# Patient Record
Sex: FEMALE | Race: OTHER | Employment: FULL TIME | URBAN - METROPOLITAN AREA
[De-identification: names, ages, dates, MRNs, and addresses within clinical notes are randomized per-mention and may not be internally consistent; named-entity substitution may affect disease eponyms.]

---

## 2017-03-25 ENCOUNTER — OFFICE VISIT (OUTPATIENT)
Dept: INTERNAL MEDICINE CLINIC | Facility: CLINIC | Age: 57
End: 2017-03-25

## 2017-03-25 VITALS
HEIGHT: 59 IN | WEIGHT: 120.25 LBS | HEART RATE: 67 BPM | RESPIRATION RATE: 16 BRPM | SYSTOLIC BLOOD PRESSURE: 116 MMHG | OXYGEN SATURATION: 99 % | TEMPERATURE: 98 F | BODY MASS INDEX: 24.24 KG/M2 | DIASTOLIC BLOOD PRESSURE: 74 MMHG

## 2017-03-25 DIAGNOSIS — H54.62 VISION LOSS OF LEFT EYE: ICD-10-CM

## 2017-03-25 DIAGNOSIS — G43.009 MIGRAINE WITHOUT AURA AND WITHOUT STATUS MIGRAINOSUS, NOT INTRACTABLE: ICD-10-CM

## 2017-03-25 DIAGNOSIS — G89.29 CHRONIC MIDLINE THORACIC BACK PAIN: Primary | ICD-10-CM

## 2017-03-25 DIAGNOSIS — M54.6 CHRONIC MIDLINE THORACIC BACK PAIN: Primary | ICD-10-CM

## 2017-03-25 PROCEDURE — 99204 OFFICE O/P NEW MOD 45 MIN: CPT | Performed by: INTERNAL MEDICINE

## 2017-03-25 RX ORDER — MV,CA,MIN/IRON/FA/GUARANA/CAFF 9-400-200
1 TABLET ORAL DAILY
COMMUNITY
End: 2017-08-28 | Stop reason: ALTCHOICE

## 2017-03-25 NOTE — PATIENT INSTRUCTIONS
Consejos Para [place] [facility] Tips]    Hay ciertas cosas que usted puede hacer para prevenir la recurrencia del dolor de espalda rafael y reducir los síntomas del dolor de espalda crónico:  · Mantenga un peso saludable.  Si tiene sobrep · Puede usar acetaminofén (Tylenol) o ibuprofeno (Motrin o Advil) para controlar el dolor, a menos que le hayan recetado otro medicamento. [NOTA: Si tiene jignesh enfermedad hepática o renal crónica, o ha tenido alguna vez jignesh úlcera estomacal o sangrado gastro Cuando tenga que estar parado (de pie) celia largos períodos apoye la mayor parte del peso sobre jignesh pierna, [place] de pierna cada pocos minutos.   AL DORMIR  La mejor manera de dormir es de lado, con las rodillas dobladas.  Apoye la radhika en jignesh almo Si están jazmyn, los músculos de la parte baja de la espalda y los abdominales pueden actuar conjuntamente para brindar un buen apoyo a la columna.  Vallorie Longs descritos a continuación le ayudarán a fortalecer los músculos de la parte baja de la espald 6. Extensión lumbar boca abajo: Acuéstese boca abajo, con los brazos extendidos [place] y las pamella contra el piso. [place] simultáneamente bass [place] y bass pierna izquierda hasta donde pueda elevarlos sin sentir molestias.  Sostenga esta posició Ejercicios para la espalda: Abdominal con elevación de rodillas flexionadas   El abdominal con elevación de rodillas flexionadas fortalece los músculos abdominales inferiores, lo que ayuda a estabilizar bass pelvis y bass espalda:  · Acuéstese boca arriba en e Perfecto lew ejercicio en la posición de cuatro patas (con las mike y las rodillas en el suelo). [address] y las mike debajo de los hombros. Mantenga la columna en posición neutral (sin arquearla ni aflojarla).  Asegúrese de · Acuéstese boca arriba con la espalda y [address]. Flexione las rodillas, dejando los pies apoyados contra el piso. · Contraiga los músculos abdominales y los glúteos.  Eleve lentamente los glúteos hasta formar jignesh línea recta Para bass propia seguridad, consulte con bass proveedor de atención médica antes de iniciar cualquier programa de ejercicios. Date Last Reviewed: 8/16/2015  © 6469-2644 [address], [address] [place], [place] [address]NelsonAdi Zazueta.  Todos los [facility] · Sostenga la posición celia 10 segundos. Vuelva a la posición inicial.  · Repita esto 3 veces. · David bowens. Date Last Reviewed: 8/16/2015  © 6655-6616 [facility] [address] [facility], [address] [place] [address] Bass[place].  Todos los derechos rese · Repita esto 5 veces. · David bowens. Date Last Reviewed: 8/16/2015  © 8620-8668 [address], [address] [place] [address]. Todos los derechos reservados.  Esta información no pretende sustituir la [facility] profesion © 5701-6151 The 706 [facility], [facility]2 Lee Carlita. Todos los derechos reservados. Esta información no pretende sustituir la atención médica profesional. Sólo bass médico puede diagnosticar y tratar un problema de janell. © 7061-4055 The 706 [facility], [facility] Lamoni Carlita. Todos los derechos reservados. Esta información no pretende sustituir la atención médica profesional. Sólo bass médico puede diagnosticar y tratar un problema de janell. © 6656-8567 The 706 [facility], [facility]2 Eros Carlita. Todos los derechos reservados. Esta información no pretende sustituir la atención médica profesional. Sólo bass médico puede diagnosticar y tratar un problema de janell.

## 2017-03-25 NOTE — PROGRESS NOTES
Kyree Wang is a 49year old female. HPI:   Patient presents for the following - and comes in with daughter, Yen Galvez. 1. Occ has midline back pain which radiates to her front on both sides. She is a poor historian and is not able to give many details. CHOLECYSTECTOMY        1985      Social History:      Smoking Status: Never Smoker                      Smokeless Status: Never Used                        Alcohol Use: No                    EXAM:   /74 mmHg  Pulse 67  Temp(Src) 98.2 °F (36.8

## 2017-03-29 NOTE — PROGRESS NOTES
Outside records received:  TTE done on 11/12/2016 because Dr. Wood Montes heard systolic ejection murmur.  Patient asympomatic: normal LV size with EF 65%, mild MR and AR, mild TR with normal righ chamber size and pressures    Labs on 10/29/2016:  WBC 5.3  HGB

## 2017-08-28 ENCOUNTER — OFFICE VISIT (OUTPATIENT)
Dept: INTERNAL MEDICINE CLINIC | Facility: CLINIC | Age: 57
End: 2017-08-28

## 2017-08-28 VITALS
RESPIRATION RATE: 17 BRPM | DIASTOLIC BLOOD PRESSURE: 64 MMHG | HEART RATE: 72 BPM | TEMPERATURE: 98 F | HEIGHT: 59 IN | BODY MASS INDEX: 23.94 KG/M2 | SYSTOLIC BLOOD PRESSURE: 90 MMHG | WEIGHT: 118.75 LBS

## 2017-08-28 DIAGNOSIS — M54.2 CERVICALGIA: ICD-10-CM

## 2017-08-28 DIAGNOSIS — G44.229 CHRONIC TENSION-TYPE HEADACHE, NOT INTRACTABLE: ICD-10-CM

## 2017-08-28 DIAGNOSIS — S46.001A INJURY OF RIGHT ROTATOR CUFF, INITIAL ENCOUNTER: Primary | ICD-10-CM

## 2017-08-28 PROBLEM — G89.29 CHRONIC MIDLINE THORACIC BACK PAIN: Status: RESOLVED | Noted: 2017-03-25 | Resolved: 2017-08-28

## 2017-08-28 PROBLEM — H54.62 VISION LOSS OF LEFT EYE: Status: RESOLVED | Noted: 2017-03-25 | Resolved: 2017-08-28

## 2017-08-28 PROBLEM — M54.6 CHRONIC MIDLINE THORACIC BACK PAIN: Status: RESOLVED | Noted: 2017-03-25 | Resolved: 2017-08-28

## 2017-08-28 PROCEDURE — 99214 OFFICE O/P EST MOD 30 MIN: CPT | Performed by: INTERNAL MEDICINE

## 2017-08-28 NOTE — PATIENT INSTRUCTIONS
Si leonard saurabh de radhika no están mejorando con la terapia física, por favor llámeme. Pediré un cerebro de MRI. Recomiendo encarecidamente jignesh colonoscopia. Por favor, hágamelo saber cuando esté listo y pediré jignesh remisión.       Estreñimiento ([address] Carlos Joselito Forrester Cadet: [address] y secas (uvas pasas, ciruelas secas, albaricoques [damascos], fresas, higos). ¨ Verduras:  Todas las verduras frescas, especialmente, chícharos (arvejas), brócoli, repollitos de Bruselas, calabazas de invierno, frijoles ( [place] de las mejores maneras de tratar el estreñimiento es aumentar la fibra que come. Usted puede hacer esto a través de la dieta o mediante suplementos de Hedy.  La fibra (que está en los granos enteros, las frutas y los vegetales) se agranda en los intest © 8321-3734 The 706 [facility], [facility]2 [place] Carlita. Todos los derechos reservados. Esta información no pretende sustituir la atención médica profesional. Sólo bass médico puede diagnosticar y tratar un problema de janell. Legumbres (frijoles, lentejas y garbanzos). Taylor taza de lentejas cocidas proporcionan más de 13 gramos de [place]. También pruebe las habas. Semillas. Un puñado de semillas (de girasol, por ejemplo) le da aproximadamente 3 g de [place].   Kasandra Thibodeaux de la fi · Tiene antecedentes personales de diabetes tipo 2, enfermedad de Crohn o colitis ulcerativa  · Tiene un síndrome genético heredado santiago el síndrome de [place] (también conocido santiago cáncer colorrectal hereditario sin poliposis, CCHSP) o poliposis adenomatos En los antecedentes de janell y el examen so hace lo siguiente:  · Historial médico. Bass proveedor de atención médica le preguntará sobre bass historial médico. Dígale si usted o alguien de bass [de-identified] garcia tenido cáncer de colon o pólipos.  Meagan Fisher Esta prueba utiliza dany X para obtener imágenes de la totalidad del colon y el recto. Se requiere jignesh preparación del intestino el día anterior a la prueba para limpiar el colon y el recto.  Para preparar los intestinos, deberá seguir jignesh dieta líquida co · Colonoscopia. Esta prueba puede usarse para detectar y remover pólipos en cualquier parte del colon o el recto. El día antes del examen, deberá preparar leonard intestinos con Dhara Simmonds dieta líquida más un enema o jignesh solución laxante jermain.  [place] se hace para li La colonoscopia es la única prueba de detección que le permite a bass proveedor de atención médica susie todo el colon y el recto.  Esta prueba también permite que bass proveedor de atención médica extraiga todos los trozos de tejido que necesiten analizarse en u La tendinitis se produce por lesión o exceso de uso de los tendones del manguito de los rotadores. La causa más común de lesión es jignesh actividad en la que el brazo se mueve repetidamente por encima de la radhika.  Pueden ser actividades relacionadas con bass t Llame a bass proveedor de [facility] de inmediato si nota alguno de los siguientes síntomas:  · Fiebre de 100.4º F (38º C) o superior, o según le indiquen  · Los síntomas no [place], o empeoran  · Síntomas nuevos   Date Last Reviewed: 3/10/2016  © 2000- 3. Parado en el mismo lugar, gire el cuerpo alejándolo del bart de la manuel. Detenga el movimiento cuando sienta el estiramiento en el hombro. Intente mantener la posición celia 5 segundos.   4. Aumente gradualmente la frecuencia de lew ejercicio [employer] 6. Ahueque la otra mano debajo de la mano que tiene detrás de la espalda. Empuje suavemente hacia arriba con la mano ahuecada hasta que sienta el estiramiento del hombro. Intente mantener la posición celia 5  segundos.   7. Aumente gradualmente la frecuen 8. Ponga la mano del lado que desea estirar sobre el hombro opuesto. Bass codo deberá estar apuntando en dirección opuesta a bass cuerpo. Intente elevar el codo lo más cerca posible de la altura del hombro.   9. Con la otra mano, empuje el codo elevado [employer] Nota: Siga todas las instrucciones especiales que le den. Si tiene dolor, deje de hacer el ejercicio. Si sigue sintiendo dolor después de detenerse, llame a bass proveedor de [facility]. · Párese con el hombro a unos 2 pies de distancia de la pared.   · · Jale suavemente la toalla con bass brazo delantero. Deje que el brazo posterior se deslice hacia arriba hasta donde pueda hacerlo cómodamente. Sentirá un estiramiento en el hombro. Sostenga el estiramiento por unos segundos. · Repita esto 3–5 veces.  Guy Schwab Para comenzar, párese shahnaz erguido, con los oídos, los hombros y las caderas [place]. Debe tener los pies ligeramente separados, colocados venice debajo de las caderas. Enfoque la vista directamente hacia adelante.  Párese en esta posición celia unos seg Para empezar, siéntese en jignesh silla, apoyando las plantas de los pies en el suelo. Bass peso debe estar desplazado ligeramente hacia adelante, de modo que bass cuerpo esté shahnaz balanceado [employer] [place].  Relaje los hombros y [facility] radhika shahnaz nivelada Para empezar, siéntese en jignesh silla, apoyando las plantas de los pies en el suelo. Desplace bass peso ligeramente hacia adelante para no arquear la espalda. Relájese. Mantenga los oídos, los hombros y las caderas [address]   · [employer] brazos a la

## 2017-08-28 NOTE — PROGRESS NOTES
Wilmer Wilson is a 56year old female. HPI:   Patient presents for the following - and comes in with daughter, Veronica Szymanski. They were 12 minutes late to appt. 1. Left sided headaches that moved to R side yesterday.  She has been suffering from migraines but • Diabetes Mother       History reviewed. No pertinent past medical history.    Past Surgical History:  : CHOLECYSTECTOMY  1985:    Social History:    Smoking status: Never Smoker                                                              Smokel only has pain with palpation. She did not mention it during review of systems. We discussed preventing constipation with high fiber diet, hydration, and exercise. However, she is also overdue for a screening colonoscopy but she is very reluctant to pursue.

## 2018-11-10 ENCOUNTER — OFFICE VISIT (OUTPATIENT)
Dept: INTERNAL MEDICINE CLINIC | Facility: CLINIC | Age: 58
End: 2018-11-10
Payer: COMMERCIAL

## 2018-11-10 VITALS
OXYGEN SATURATION: 98 % | HEART RATE: 70 BPM | SYSTOLIC BLOOD PRESSURE: 100 MMHG | TEMPERATURE: 98 F | BODY MASS INDEX: 24.23 KG/M2 | HEIGHT: 58.25 IN | WEIGHT: 117 LBS | DIASTOLIC BLOOD PRESSURE: 70 MMHG | RESPIRATION RATE: 13 BRPM

## 2018-11-10 DIAGNOSIS — Z12.11 SCREEN FOR COLON CANCER: ICD-10-CM

## 2018-11-10 DIAGNOSIS — Z00.00 ROUTINE GENERAL MEDICAL EXAMINATION AT A HEALTH CARE FACILITY: Primary | ICD-10-CM

## 2018-11-10 DIAGNOSIS — M54.2 NECK PAIN ON LEFT SIDE: ICD-10-CM

## 2018-11-10 DIAGNOSIS — G44.221 CHRONIC TENSION-TYPE HEADACHE, INTRACTABLE: ICD-10-CM

## 2018-11-10 DIAGNOSIS — G43.009 MIGRAINE WITHOUT AURA AND WITHOUT STATUS MIGRAINOSUS, NOT INTRACTABLE: ICD-10-CM

## 2018-11-10 DIAGNOSIS — Z12.39 SCREENING FOR BREAST CANCER: ICD-10-CM

## 2018-11-10 DIAGNOSIS — I83.812 VARICOSE VEINS OF LEFT LOWER EXTREMITY WITH PAIN: ICD-10-CM

## 2018-11-10 DIAGNOSIS — Z12.4 SCREENING FOR CERVICAL CANCER: ICD-10-CM

## 2018-11-10 PROCEDURE — 84450 TRANSFERASE (AST) (SGOT): CPT | Performed by: INTERNAL MEDICINE

## 2018-11-10 PROCEDURE — 90471 IMMUNIZATION ADMIN: CPT | Performed by: INTERNAL MEDICINE

## 2018-11-10 PROCEDURE — 80061 LIPID PANEL: CPT | Performed by: INTERNAL MEDICINE

## 2018-11-10 PROCEDURE — 88175 CYTOPATH C/V AUTO FLUID REDO: CPT | Performed by: INTERNAL MEDICINE

## 2018-11-10 PROCEDURE — 86803 HEPATITIS C AB TEST: CPT | Performed by: INTERNAL MEDICINE

## 2018-11-10 PROCEDURE — 90715 TDAP VACCINE 7 YRS/> IM: CPT | Performed by: INTERNAL MEDICINE

## 2018-11-10 PROCEDURE — 99396 PREV VISIT EST AGE 40-64: CPT | Performed by: INTERNAL MEDICINE

## 2018-11-10 PROCEDURE — 80048 BASIC METABOLIC PNL TOTAL CA: CPT | Performed by: INTERNAL MEDICINE

## 2018-11-10 PROCEDURE — 84460 ALANINE AMINO (ALT) (SGPT): CPT | Performed by: INTERNAL MEDICINE

## 2018-11-10 PROCEDURE — 99213 OFFICE O/P EST LOW 20 MIN: CPT | Performed by: INTERNAL MEDICINE

## 2018-11-10 NOTE — PROGRESS NOTES
Lucero Smith is a 66year old female. HPI:   Patient presents for a physical exam and additional issues noted below. Will be billed for two visits. 1. Midline Neck pain and chronic headaches: she works 2 jobs 6 days per week. Only has saturdays off.  Tea Rey Laterality Date   • CHOLECYSTECTOMY     •   5      Social History:    Social History    Tobacco Use      Smoking status: Never Smoker      Smokeless tobacco: Never Used    Alcohol use: No    Drug use: No            EXAM:   /84 (BP Location on her own. Indication for ASA (50-57 yo): labs ordered  Colon Cancer Screening: has never had one. Counseled and she is agreeable. Referred to [facility] for colonoscopy. Cervical Cancer Screening: pap smear today.    Breast Cancer Screening:

## 2018-11-29 ENCOUNTER — TELEPHONE (OUTPATIENT)
Dept: INTERNAL MEDICINE CLINIC | Facility: CLINIC | Age: 58
End: 2018-11-29

## 2018-12-03 ENCOUNTER — OFFICE VISIT (OUTPATIENT)
Dept: SURGERY | Facility: CLINIC | Age: 58
End: 2018-12-03
Payer: COMMERCIAL

## 2018-12-03 VITALS
WEIGHT: 117 LBS | HEART RATE: 63 BPM | BODY MASS INDEX: 23.59 KG/M2 | HEIGHT: 59 IN | DIASTOLIC BLOOD PRESSURE: 81 MMHG | SYSTOLIC BLOOD PRESSURE: 124 MMHG | RESPIRATION RATE: 16 BRPM

## 2018-12-03 DIAGNOSIS — I83.819 VARICOSE VEINS WITH PAIN: Primary | ICD-10-CM

## 2018-12-03 PROCEDURE — 99243 OFF/OP CNSLTJ NEW/EST LOW 30: CPT | Performed by: SURGERY

## 2018-12-03 NOTE — H&P
New Patient Visit Note       Active Problems      1. Varicose veins with pain        Chief Complaint   Patient presents with:  Varicose Veins: [facility] PT ref by PCP for bilat varicose veins- no imaging done.  PT states she has a burning sensation and some swelli Systems  The Review of Systems has been reviewed by me during today. Review of Systems   Constitutional: Negative for chills, diaphoresis, fatigue, fever and unexpected weight change.    HENT: Negative for hearing loss, nosebleeds, sore throat and trouble normal. Judgment and thought content normal.   Nursing note and vitals reviewed. Assessment   Varicose veins with pain  (primary encounter diagnosis)      Plan   · To begin treating her veins, I have asked her to wear compression stockings.   She w

## 2019-02-12 ENCOUNTER — OFFICE VISIT (OUTPATIENT)
Dept: NEUROLOGY | Facility: CLINIC | Age: 59
End: 2019-02-12
Payer: COMMERCIAL

## 2019-02-12 ENCOUNTER — PROCEDURE VISIT (OUTPATIENT)
Dept: NEUROLOGY | Facility: CLINIC | Age: 59
End: 2019-02-12
Payer: COMMERCIAL

## 2019-02-12 ENCOUNTER — LAB ENCOUNTER (OUTPATIENT)
Dept: LAB | Age: 59
End: 2019-02-12
Attending: Other
Payer: COMMERCIAL

## 2019-02-12 VITALS
RESPIRATION RATE: 16 BRPM | HEIGHT: 59 IN | HEART RATE: 78 BPM | BODY MASS INDEX: 23.59 KG/M2 | WEIGHT: 117 LBS | SYSTOLIC BLOOD PRESSURE: 130 MMHG | DIASTOLIC BLOOD PRESSURE: 78 MMHG

## 2019-02-12 DIAGNOSIS — G56.03 BILATERAL CARPAL TUNNEL SYNDROME: Primary | ICD-10-CM

## 2019-02-12 DIAGNOSIS — G56.03 BILATERAL CARPAL TUNNEL SYNDROME: ICD-10-CM

## 2019-02-12 DIAGNOSIS — M54.12 CERVICAL RADICULOPATHY: ICD-10-CM

## 2019-02-12 DIAGNOSIS — G43.709 CHRONIC MIGRAINE W/O AURA, NOT INTRACTABLE, W/O STAT MIGR: Primary | ICD-10-CM

## 2019-02-12 DIAGNOSIS — R41.3 MEMORY DIFFICULTIES: ICD-10-CM

## 2019-02-12 LAB
RHEUMATOID FACT SERPL-ACNC: <10 IU/ML (ref ?–15)
SED RATE-ML: 8 MM/HR (ref 0–25)
T4 FREE SERPL-MCNC: 1.1 NG/DL (ref 0.8–1.7)
TSI SER-ACNC: 1.41 MIU/ML (ref 0.36–3.74)
VIT B12 SERPL-MCNC: 356 PG/ML (ref 193–986)

## 2019-02-12 PROCEDURE — 86225 DNA ANTIBODY NATIVE: CPT | Performed by: OTHER

## 2019-02-12 PROCEDURE — 95910 NRV CNDJ TEST 7-8 STUDIES: CPT | Performed by: OTHER

## 2019-02-12 PROCEDURE — 99204 OFFICE O/P NEW MOD 45 MIN: CPT | Performed by: OTHER

## 2019-02-12 PROCEDURE — 86235 NUCLEAR ANTIGEN ANTIBODY: CPT | Performed by: OTHER

## 2019-02-12 PROCEDURE — 85652 RBC SED RATE AUTOMATED: CPT | Performed by: OTHER

## 2019-02-12 PROCEDURE — 36415 COLL VENOUS BLD VENIPUNCTURE: CPT | Performed by: OTHER

## 2019-02-12 PROCEDURE — 86431 RHEUMATOID FACTOR QUANT: CPT | Performed by: OTHER

## 2019-02-12 PROCEDURE — 84439 ASSAY OF FREE THYROXINE: CPT | Performed by: OTHER

## 2019-02-12 PROCEDURE — 86038 ANTINUCLEAR ANTIBODIES: CPT | Performed by: OTHER

## 2019-02-12 PROCEDURE — 84443 ASSAY THYROID STIM HORMONE: CPT | Performed by: OTHER

## 2019-02-12 PROCEDURE — 95886 MUSC TEST DONE W/N TEST COMP: CPT | Performed by: OTHER

## 2019-02-12 PROCEDURE — 82607 VITAMIN B-12: CPT | Performed by: OTHER

## 2019-02-12 RX ORDER — SUMATRIPTAN 100 MG/1
100 TABLET, FILM COATED ORAL EVERY 2 HOUR PRN
Qty: 9 TABLET | Refills: 5 | Status: SHIPPED | OUTPATIENT
Start: 2019-02-12 | End: 2019-03-14

## 2019-02-12 NOTE — H&P
[address] [facility] with [facility]  2/12/2019    8:42 AM    58 RHF    Headaches and migraine    Also right hand gets numb when she uses it.  This has been ongoing for 2 months, she has neck pains as well but th normal.      IMPRESSION:  Chronic migraine w/o aura, not intractable, w/o stat migr  (primary encounter diagnosis)  Memory difficulties  Bilateral carpal tunnel syndrome  Cervical radiculopathy      A.  Diagnostics:   MRI of BRAIN with MRA   EMG arms   B12

## 2019-02-12 NOTE — PROGRESS NOTES
HERE FOR EMG arms      RESULTS  1. Right median distal motor latency is moderately delayed. Sensory latency is also moderately delayed  2. Bilateral ulnar velocities were within normal  3.   Left median distal motor latency is normal   The median sensory

## 2019-02-13 LAB
ANA SCREEN: POSITIVE
CENTROMERE AUTOAB: <100 AU/ML (ref ?–100)
DSDNA AUTOAB: <100 IU/ML (ref ?–100)
HISTONE AUTOAB: <100 AU/ML (ref ?–100)
JO-1 AUTOAB: <100 AU/ML (ref ?–100)
RNP AUTOAB: <100 AU/ML (ref ?–100)
SCL-70 AUTOAB: <100 AU/ML (ref ?–100)
SM AUTOAB (SMITH): <100 AU/ML (ref ?–100)
SSA AUTOAB: 123 AU/ML (ref ?–100)
SSB AUTOAB: <100 AU/ML (ref ?–100)

## 2019-03-09 ENCOUNTER — HOSPITAL ENCOUNTER (OUTPATIENT)
Dept: MRI IMAGING | Age: 59
Discharge: HOME | End: 2019-03-09
Attending: Other
Payer: COMMERCIAL

## 2019-03-09 DIAGNOSIS — R41.3 MEMORY DIFFICULTIES: ICD-10-CM

## 2019-03-09 DIAGNOSIS — G43.709 CHRONIC MIGRAINE W/O AURA, NOT INTRACTABLE, W/O STAT MIGR: ICD-10-CM

## 2019-03-09 PROCEDURE — 70544 MR ANGIOGRAPHY HEAD W/O DYE: CPT | Performed by: OTHER

## 2019-03-09 PROCEDURE — A9575 INJ GADOTERATE MEGLUMI 0.1ML: HCPCS | Performed by: OTHER

## 2019-03-09 PROCEDURE — 70553 MRI BRAIN STEM W/O & W/DYE: CPT | Performed by: OTHER

## 2019-03-18 ENCOUNTER — TELEPHONE (OUTPATIENT)
Dept: NEUROLOGY | Facility: CLINIC | Age: 59
End: 2019-03-18

## 2019-03-20 NOTE — TELEPHONE ENCOUNTER
Please have patient schedule follow-up visit to review MRI Brain results with Dr. Norma Pruett. There was a possible enlarged tonsil or tonsillar mass that she needs to follow-up with pcp for further evaluation.

## 2019-03-20 NOTE — TELEPHONE ENCOUNTER
Spoke to patient and gave results per Diandra lovell and to see pcp regarding possible mass in tonsills.  Patient verbalized understanding

## 2019-03-25 ENCOUNTER — OFFICE VISIT (OUTPATIENT)
Dept: INTERNAL MEDICINE CLINIC | Facility: CLINIC | Age: 59
End: 2019-03-25
Payer: COMMERCIAL

## 2019-03-25 ENCOUNTER — OFFICE VISIT (OUTPATIENT)
Dept: NEUROLOGY | Facility: CLINIC | Age: 59
End: 2019-03-25
Payer: COMMERCIAL

## 2019-03-25 VITALS
HEART RATE: 74 BPM | BODY MASS INDEX: 23.59 KG/M2 | HEIGHT: 59 IN | RESPIRATION RATE: 16 BRPM | WEIGHT: 117 LBS | SYSTOLIC BLOOD PRESSURE: 126 MMHG | DIASTOLIC BLOOD PRESSURE: 72 MMHG

## 2019-03-25 VITALS
OXYGEN SATURATION: 99 % | HEART RATE: 83 BPM | SYSTOLIC BLOOD PRESSURE: 124 MMHG | DIASTOLIC BLOOD PRESSURE: 62 MMHG | HEIGHT: 59 IN | WEIGHT: 120.75 LBS | RESPIRATION RATE: 18 BRPM | BODY MASS INDEX: 24.34 KG/M2 | TEMPERATURE: 98 F

## 2019-03-25 DIAGNOSIS — F39 MOOD DISORDER (HCC): ICD-10-CM

## 2019-03-25 DIAGNOSIS — G43.009 MIGRAINE WITHOUT AURA AND WITHOUT STATUS MIGRAINOSUS, NOT INTRACTABLE: Primary | ICD-10-CM

## 2019-03-25 DIAGNOSIS — R76.8 ANA POSITIVE: ICD-10-CM

## 2019-03-25 DIAGNOSIS — J35.8 TONSILLAR MASS: ICD-10-CM

## 2019-03-25 DIAGNOSIS — G56.03 BILATERAL CARPAL TUNNEL SYNDROME: ICD-10-CM

## 2019-03-25 DIAGNOSIS — G44.221 CHRONIC TENSION-TYPE HEADACHE, INTRACTABLE: ICD-10-CM

## 2019-03-25 DIAGNOSIS — G43.709 CHRONIC MIGRAINE W/O AURA, NOT INTRACTABLE, W/O STAT MIGR: ICD-10-CM

## 2019-03-25 PROCEDURE — 99214 OFFICE O/P EST MOD 30 MIN: CPT | Performed by: INTERNAL MEDICINE

## 2019-03-25 PROCEDURE — 99213 OFFICE O/P EST LOW 20 MIN: CPT | Performed by: OTHER

## 2019-03-25 RX ORDER — SUMATRIPTAN 100 MG/1
100 TABLET, FILM COATED ORAL EVERY 2 HOUR PRN
COMMUNITY
End: 2019-11-26

## 2019-03-25 NOTE — PROGRESS NOTES
[facility]  [place]  5/28/1960  Primary Care Provider:  Santana Vera MD    3/25/2019  Accompanied visit:  ( ) No ( x) yes, by: 2 daughters accompanied the patient    66 year o intracranial aneurysm, flow-limiting stenosis, or focal arterial occlusion. Past History update/new problem: as noted above    Review of Systems:  Review of Systems:  Denies systemic symptoms     No CP or SOB. No GI or  symptoms.  Relevant Neuro as n there is a facility that is open. She will also try half dose of sumatriptan for the milder tension-like headaches. Biologically, this probably activates the trigeminal system which leads to a similar migrainous process.   She would combine the sumatr

## 2019-03-25 NOTE — PROGRESS NOTES
Monroe Michelle is a 66year old female. HPI:   Patient presents for the following issues. Comes in with daughter, Rhodia Essex, and niece, Mayela Slaughter.   1. R tonsillar mass: noted incidentally on MRI brain done for migraines.  She denies any recent illnesses or sor thr No    Drug use: No            EXAM:   /62   Pulse 83   Temp 98.4 °F (36.9 °C) (Oral)   Resp 18   Ht 59\"   Wt 120 lb 12 oz   SpO2 99%   BMI 24.39 kg/m²   GENERAL: A&O well developed, well nourished,in no apparent distress  SKIN: no rashes,no suspicio her own. Indication for ASA (50-57 yo): labs ordered  Colon Cancer Screening: has never had one. Counseled and she is agreeable. She really like Dr. Shayna Dawkins and I explained she can get colonoscopy by her.    Cervical Cancer Screening: neg cytology in 11/20

## 2019-03-25 NOTE — PATIENT INSTRUCTIONS
Programe bass mamografía lo antes posible. Isabelle Brandon Edward para jignesh colonoscopia de detección.

## 2019-04-18 ENCOUNTER — TELEPHONE (OUTPATIENT)
Dept: INTERNAL MEDICINE CLINIC | Facility: CLINIC | Age: 59
End: 2019-04-18

## 2019-04-18 NOTE — TELEPHONE ENCOUNTER
Received call from [facility]. She stated the radiologist recommends CT soft tissue with just contrast to reduce pt's exposure to radiation. Please advise if OK for CT with contrast only.  TY

## 2019-04-19 NOTE — TELEPHONE ENCOUNTER
Stefan Dials called back and stated that order that was placed was without contrast. She is inquiring if KS meant to have order placed WITH contras only. I spoke with KS via telephone and per TO, pt is to have CT soft tissue of neck with contrast only.  FLAKO

## 2019-04-22 ENCOUNTER — HOSPITAL ENCOUNTER (OUTPATIENT)
Dept: MAMMOGRAPHY | Age: 59
Discharge: HOME | End: 2019-04-22
Attending: INTERNAL MEDICINE
Payer: COMMERCIAL

## 2019-04-22 ENCOUNTER — HOSPITAL ENCOUNTER (OUTPATIENT)
Dept: CT IMAGING | Age: 59
Discharge: HOME | End: 2019-04-22
Attending: INTERNAL MEDICINE
Payer: COMMERCIAL

## 2019-04-22 DIAGNOSIS — Z12.39 SCREENING FOR BREAST CANCER: ICD-10-CM

## 2019-04-22 DIAGNOSIS — J35.8 TONSILLAR MASS: ICD-10-CM

## 2019-04-22 PROCEDURE — 70491 CT SOFT TISSUE NECK W/DYE: CPT | Performed by: INTERNAL MEDICINE

## 2019-04-22 PROCEDURE — 77067 SCR MAMMO BI INCL CAD: CPT | Performed by: INTERNAL MEDICINE

## 2019-04-22 PROCEDURE — 82565 ASSAY OF CREATININE: CPT

## 2019-06-14 ENCOUNTER — TELEPHONE (OUTPATIENT)
Dept: INTERNAL MEDICINE CLINIC | Facility: CLINIC | Age: 59
End: 2019-06-14

## 2019-06-14 NOTE — TELEPHONE ENCOUNTER
Patient is Mongolian speaking. Please reach out to her regarding her colonoscopy. She agreed to see Dr. Jayne [place] at last office visit. Please remind her she is 7 years overdue and if her colonoscopy is normal, she will not need it for 10 years.

## 2019-08-12 ENCOUNTER — OFFICE VISIT (OUTPATIENT)
Dept: RHEUMATOLOGY | Facility: CLINIC | Age: 59
End: 2019-08-12
Payer: COMMERCIAL

## 2019-08-12 ENCOUNTER — LAB ENCOUNTER (OUTPATIENT)
Dept: LAB | Age: 59
End: 2019-08-12
Attending: INTERNAL MEDICINE
Payer: COMMERCIAL

## 2019-08-12 VITALS
HEART RATE: 72 BPM | WEIGHT: 125 LBS | TEMPERATURE: 99 F | RESPIRATION RATE: 16 BRPM | SYSTOLIC BLOOD PRESSURE: 129 MMHG | DIASTOLIC BLOOD PRESSURE: 74 MMHG | BODY MASS INDEX: 25 KG/M2

## 2019-08-12 DIAGNOSIS — M79.641 BILATERAL HAND PAIN: ICD-10-CM

## 2019-08-12 DIAGNOSIS — R76.8 POSITIVE ANA (ANTINUCLEAR ANTIBODY): ICD-10-CM

## 2019-08-12 DIAGNOSIS — G44.221 CHRONIC TENSION-TYPE HEADACHE, INTRACTABLE: ICD-10-CM

## 2019-08-12 DIAGNOSIS — G89.29 CHRONIC PAIN OF BOTH KNEES: ICD-10-CM

## 2019-08-12 DIAGNOSIS — R59.0 LYMPHADENOPATHY OF HEAD AND NECK REGION: ICD-10-CM

## 2019-08-12 DIAGNOSIS — M54.2 NECK PAIN ON LEFT SIDE: ICD-10-CM

## 2019-08-12 DIAGNOSIS — R76.8 SS-A ANTIBODY POSITIVE: ICD-10-CM

## 2019-08-12 DIAGNOSIS — M25.561 CHRONIC PAIN OF BOTH KNEES: ICD-10-CM

## 2019-08-12 DIAGNOSIS — M25.562 CHRONIC PAIN OF BOTH KNEES: ICD-10-CM

## 2019-08-12 DIAGNOSIS — M79.642 BILATERAL HAND PAIN: ICD-10-CM

## 2019-08-12 DIAGNOSIS — R76.8 POSITIVE ANA (ANTINUCLEAR ANTIBODY): Primary | ICD-10-CM

## 2019-08-12 LAB
BASOPHILS # BLD AUTO: 0.03 X10(3) UL (ref 0–0.2)
BASOPHILS NFR BLD AUTO: 0.5 %
DEPRECATED RDW RBC AUTO: 37.8 FL (ref 35.1–46.3)
EOSINOPHIL # BLD AUTO: 0.21 X10(3) UL (ref 0–0.7)
EOSINOPHIL NFR BLD AUTO: 3.8 %
ERYTHROCYTE [DISTWIDTH] IN BLOOD BY AUTOMATED COUNT: 11.5 % (ref 11–15)
HCT VFR BLD AUTO: 40.8 % (ref 35–48)
HGB BLD-MCNC: 13.6 G/DL (ref 12–16)
IMM GRANULOCYTES # BLD AUTO: 0 X10(3) UL (ref 0–1)
IMM GRANULOCYTES NFR BLD: 0 %
LYMPHOCYTES # BLD AUTO: 1.56 X10(3) UL (ref 1–4)
LYMPHOCYTES NFR BLD AUTO: 28.2 %
MCH RBC QN AUTO: 30 PG (ref 26–34)
MCHC RBC AUTO-ENTMCNC: 33.3 G/DL (ref 31–37)
MCV RBC AUTO: 90.1 FL (ref 80–100)
MONOCYTES # BLD AUTO: 0.38 X10(3) UL (ref 0.1–1)
MONOCYTES NFR BLD AUTO: 6.9 %
NEUTROPHILS # BLD AUTO: 3.36 X10 (3) UL (ref 1.5–7.7)
NEUTROPHILS # BLD AUTO: 3.36 X10(3) UL (ref 1.5–7.7)
NEUTROPHILS NFR BLD AUTO: 60.6 %
PLATELET # BLD AUTO: 236 10(3)UL (ref 150–450)
RBC # BLD AUTO: 4.53 X10(6)UL (ref 3.8–5.3)
WBC # BLD AUTO: 5.5 X10(3) UL (ref 4–11)

## 2019-08-12 PROCEDURE — 84165 PROTEIN E-PHORESIS SERUM: CPT | Performed by: INTERNAL MEDICINE

## 2019-08-12 PROCEDURE — 86235 NUCLEAR ANTIGEN ANTIBODY: CPT | Performed by: INTERNAL MEDICINE

## 2019-08-12 PROCEDURE — 85025 COMPLETE CBC W/AUTO DIFF WBC: CPT | Performed by: INTERNAL MEDICINE

## 2019-08-12 PROCEDURE — 99244 OFF/OP CNSLTJ NEW/EST MOD 40: CPT | Performed by: INTERNAL MEDICINE

## 2019-08-12 PROCEDURE — 86334 IMMUNOFIX E-PHORESIS SERUM: CPT | Performed by: INTERNAL MEDICINE

## 2019-08-12 PROCEDURE — 83883 ASSAY NEPHELOMETRY NOT SPEC: CPT | Performed by: INTERNAL MEDICINE

## 2019-08-12 PROCEDURE — 36415 COLL VENOUS BLD VENIPUNCTURE: CPT | Performed by: INTERNAL MEDICINE

## 2019-08-12 NOTE — PATIENT INSTRUCTIONS
También te aconsejo que te pongas la vacuna Shingrix jignesh vez en la veronika. [place] es NUEVO y se considera mejor que la vacuna contra la culebrilla anterior llamada zostavax. Puede costar [employer] 200.       Necesita 3 porciones de ocho onzas de calcio / Davey Din postura correcta. · Extienda los brazos por encima de la radhika y ligeramente hacia atrás. Deje los hombros y el filipe [place], y los codos detrás de los hombros.  · Con las pamella orientadas hacia el Dioni yates, gire los dedos Sia.   · Respir cervical y a otros huesos. El dolor de filipe generalmente es la consecuencia de jignesh lesión en estos músculos y tendones.   Causas de distensión cervical  Distintos tipos de tensión sobre el filipe pueden dañar leonard músculos y tendones (tejidos blandos) y ca C) o más, o según le hayan indicado  · Empeoramiento del dolor o la rigidez  · Síntomas que no mejoran o empeoran  · Entumecimiento, hormigueo, debilidad o saurabh fulgurantes que bajan a los brazos o las piernas  · Síntomas nuevos  Date Last Reviewed: 3/1 y [place] la radhika shahnaz nivelada. Sentarse en jignesh silla con brazos puede ayudarlo a mantener el equilibrio. 1. Presione la vasquez de la mano contra la frente. [place] resistencia con los músculos del filipe.  Siga empujando con la mano celia 10 segu mojada en [place] sobre la christine afectada, o shahnaz báñese o dúchese en [place]. · Los medicamentos de venta sin receta santiago el ibuprofeno,  el naproxeno y la aspirina pueden ayudar a reducir el dolor y la hinchazón.  El acetaminofén puede ayudar atención médica podría sugerirle otros tratamientos. Por ejemplo, ciertos medicamentos o inyecciones pueden ayudar a aliviar el dolor y la hinchazón. En algunos casos podría ser necesario someterse a jignesh operación para tratar los problemas del filipe.   Anselmo automóvil, aunque también pueden ocurrir celia jignesh caída o jignesh lesión al practicar un deporte.   · Discos debilitados: Un acto simple santiago un estornudo o jignesh tos puede causar la protuberancia (hernia) de un disco. Un disco herniado puede ejercer presión s [address] [facility] [place], [address] [facility]. Todos los derechos reservados. Esta información no pretende sustituir la atención médica profesional. Sólo bass médico puede diagnosticar y tratar un problema de janell. can sit unsupported/able to roll back to front/able to roll front to back

## 2019-08-12 NOTE — PROGRESS NOTES
?  RHEUMATOLOGY NEW PATIENT   Date of visit: 8/12/2019  ? Patient presents with:  Abnormal Labs: NP ref by PCP for abnormal labs. Pt c/o bilateral hand pain, right hand is more painful. Left side neck pain.' No fam hx autoimmune disease      ?   ASSESSME is one of 11 criteria for the diagnosis of SLE. A positive XOCHILT can be caused by many pathologic states, such as SLE, Scleroderma Spectrum Disorders, Sjogren Syndrome, and at times Autoimmune Thyroid Disease to name a few.     I will call pt with test result pain, however did not go through with. Did have EMG performed which showed moderate carpal tunnel syndrome and mild on the left. Denies any current neck pain, but when present, it is at the back and base of the head.  States occurs at night when going pertinent past medical history.   Past Surgical History:  Past Surgical History:   Procedure Laterality Date   • CHOLECYSTECTOMY     •   5     Family History:  Family History   Problem Relation Age of Onset   • Diabetes Mother      Social History lb     Body mass index is 25.25 kg/m². Body surface area is 1.51 meters squared. Pain Score: 0 - (None)       Physical Exam   Constitutional: She is oriented to person, place, and time. She appears well-developed and well-nourished. No distress.    HENT: mass and lump, head     TECHNIQUE:  IV contrast-enhanced multislice CT scanning is performed through the neck soft tissues during administration of nonionic contrast.  Dose reduction techniques were used.  Dose information is transmitted to the ACR ([place] of the brain parenchyma are grossly unremarkable. The visualized paranasal sinuses and mastoid air cells are unremarkable. The visualized orbital soft tissues are symmetric and   unremarkable.   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 Clinical correlation recommended. No significant cerebellar tonsillar dysmorphia. There is no acute intracranial hemorrhage or extra-axial fluid collection identified.   Trace chronic microvascular ischemic changes in the cerebral whit palatine tonsil which could be due to volume averaging and slice selection, with a right tonsillar mass not excluded. This measures up to 1.7 x 1.6 cm. Clinical correlation recommended.   CT of the   soft tissues of the neck with contrast is suggested for

## 2019-08-14 LAB — SSA AUTOAB: 136 AU/ML (ref ?–100)

## 2019-08-20 LAB
ALBUMIN SERPL-MCNC: 4.66 G/DL (ref 3.1–4.5)
ALBUMIN/GLOB SERPL: 1.64 {RATIO}
ALPHA1 GLOB SERPL ELPH-MCNC: 0.18 G/DL (ref 0.1–0.3)
ALPHA2 GLOB SERPL ELPH-MCNC: 0.74 G/DL (ref 0.6–1)
B-GLOBULIN SERPL ELPH-MCNC: 0.74 G/DL (ref 0.7–1.2)
GAMMA GLOB SERPL ELPH-MCNC: 1.19 G/DL (ref 0.6–1.6)
KAPPA FREE LIGHT CHAIN: 2.17 MG/DL (ref 0.33–1.94)
KAPPA/LAMBDA FLC RATIO: 1.37 (ref 0.26–1.65)
LAMBDA FREE LIGHT CHAIN: 1.58 MG/DL (ref 0.57–2.63)
M PROTEIN MFR SERPL ELPH: 7.5 G/DL (ref 6.4–8.2)

## 2019-08-22 ENCOUNTER — TELEPHONE (OUTPATIENT)
Dept: RHEUMATOLOGY | Facility: CLINIC | Age: 59
End: 2019-08-22

## 2019-08-22 NOTE — TELEPHONE ENCOUNTER
Attempted to call pt to discuss test results, mailbox is full. Will try again tomorrow.      Moises Loya,   [facility]  8/22/2019

## 2019-10-17 NOTE — TELEPHONE ENCOUNTER
Called pt., phones rings, beeps and then hangs up. [employer] message sent to have pt call office to discuss test results.  dustin

## 2019-10-23 ENCOUNTER — TELEPHONE (OUTPATIENT)
Dept: RHEUMATOLOGY | Facility: CLINIC | Age: 59
End: 2019-10-23

## 2019-10-28 ENCOUNTER — TELEPHONE (OUTPATIENT)
Dept: RHEUMATOLOGY | Facility: CLINIC | Age: 59
End: 2019-10-28

## 2019-10-30 ENCOUNTER — PATIENT MESSAGE (OUTPATIENT)
Dept: RHEUMATOLOGY | Facility: CLINIC | Age: 59
End: 2019-10-30

## 2019-10-31 NOTE — TELEPHONE ENCOUNTER
From: Monroe Michelle  To: Yoselin Shipman DO  Sent: 10/30/2019 10:21 PM CDT  Subject: Test Results Kelby Rm had called about my blood test results since you were trying to call, but the vmbox was full.  They told me they would relay the message to

## 2019-11-26 ENCOUNTER — OFFICE VISIT (OUTPATIENT)
Dept: INTERNAL MEDICINE CLINIC | Facility: CLINIC | Age: 59
End: 2019-11-26
Payer: COMMERCIAL

## 2019-11-26 VITALS
BODY MASS INDEX: 25.5 KG/M2 | WEIGHT: 126.5 LBS | HEART RATE: 66 BPM | DIASTOLIC BLOOD PRESSURE: 62 MMHG | SYSTOLIC BLOOD PRESSURE: 118 MMHG | OXYGEN SATURATION: 98 % | HEIGHT: 59 IN | RESPIRATION RATE: 20 BRPM

## 2019-11-26 DIAGNOSIS — Z00.00 LABORATORY EXAM ORDERED AS PART OF ROUTINE GENERAL MEDICAL EXAMINATION: ICD-10-CM

## 2019-11-26 DIAGNOSIS — R76.8 THYROGLOBULIN ANTIBODY POSITIVE: ICD-10-CM

## 2019-11-26 DIAGNOSIS — G43.009 MIGRAINE WITHOUT AURA AND WITHOUT STATUS MIGRAINOSUS, NOT INTRACTABLE: Primary | ICD-10-CM

## 2019-11-26 DIAGNOSIS — R92.8 ABNORMAL MAMMOGRAM: ICD-10-CM

## 2019-11-26 PROCEDURE — 99214 OFFICE O/P EST MOD 30 MIN: CPT | Performed by: INTERNAL MEDICINE

## 2019-11-26 RX ORDER — RIZATRIPTAN BENZOATE 5 MG/1
5 TABLET ORAL AS NEEDED
Qty: 10 TABLET | Refills: 0 | Status: SHIPPED | OUTPATIENT
Start: 2019-11-26

## 2019-11-26 NOTE — PATIENT INSTRUCTIONS
Ahora debe recibir bass segunda vacuna contra la shingrix. Cuando tiene [place], puede usar hasta 1200 mg de ibuprofeno a la vez. Puede usar hasta 2400 mg de ibuprofeno en 24 horas.  También puede usar rizatriptán e ibuprofeno dentro de las primeras 2 hora

## 2019-11-26 NOTE — PROGRESS NOTES
Diya Freeman is a 64year old female. HPI:   Patient presents for the following issues. Comes in with daughter, Laura Qureshi. 1. Nighttime anxiety: has improved. hsa not taken sertraline in 1 month. 2. Chronic migraines: last migraine was last week.  Sheila Torres clear  NECK: supple, no jvd, no thyromegaly  LUNGS: clear to auscultation bilateraly, no c/w/r  CARDIO: RRR without g/m/r  GI: soft non tender nondistended no hsm bs throughout  NEURO: CN 2-12 grossly intact  PSYCH: pleasant  EXTREMITIES: no cyanosis, club

## 2020-07-07 ENCOUNTER — APPOINTMENT (OUTPATIENT)
Dept: LAB | Age: 60
End: 2020-07-07
Attending: INTERNAL MEDICINE
Payer: COMMERCIAL

## 2020-07-07 DIAGNOSIS — Z00.00 LABORATORY EXAM ORDERED AS PART OF ROUTINE GENERAL MEDICAL EXAMINATION: ICD-10-CM

## 2020-07-07 LAB
ANION GAP SERPL CALC-SCNC: 1 MMOL/L (ref 0–18)
BUN BLD-MCNC: 15 MG/DL (ref 7–18)
BUN/CREAT SERPL: 18.5 (ref 10–20)
CALCIUM BLD-MCNC: 8.7 MG/DL (ref 8.5–10.1)
CHLORIDE SERPL-SCNC: 109 MMOL/L (ref 98–112)
CHOLEST SMN-MCNC: 180 MG/DL (ref ?–200)
CO2 SERPL-SCNC: 32 MMOL/L (ref 21–32)
CREAT BLD-MCNC: 0.81 MG/DL (ref 0.55–1.02)
GLUCOSE BLD-MCNC: 83 MG/DL (ref 70–99)
HDLC SERPL-MCNC: 51 MG/DL (ref 40–59)
LDLC SERPL CALC-MCNC: 103 MG/DL (ref ?–100)
NONHDLC SERPL-MCNC: 129 MG/DL (ref ?–130)
OSMOLALITY SERPL CALC.SUM OF ELEC: 294 MOSM/KG (ref 275–295)
PATIENT FASTING Y/N/NP: YES
PATIENT FASTING Y/N/NP: YES
POTASSIUM SERPL-SCNC: 4.1 MMOL/L (ref 3.5–5.1)
SODIUM SERPL-SCNC: 142 MMOL/L (ref 136–145)
T4 FREE SERPL-MCNC: 1.1 NG/DL (ref 0.8–1.7)
TRIGL SERPL-MCNC: 130 MG/DL (ref 30–149)
TSI SER-ACNC: 1.3 MIU/ML (ref 0.36–3.74)
VLDLC SERPL CALC-MCNC: 26 MG/DL (ref 0–30)

## 2020-07-07 PROCEDURE — 80048 BASIC METABOLIC PNL TOTAL CA: CPT | Performed by: INTERNAL MEDICINE

## 2020-07-07 PROCEDURE — 84439 ASSAY OF FREE THYROXINE: CPT | Performed by: INTERNAL MEDICINE

## 2020-07-07 PROCEDURE — 80061 LIPID PANEL: CPT | Performed by: INTERNAL MEDICINE

## 2020-07-07 PROCEDURE — 84443 ASSAY THYROID STIM HORMONE: CPT | Performed by: INTERNAL MEDICINE

## 2020-07-07 PROCEDURE — 36415 COLL VENOUS BLD VENIPUNCTURE: CPT | Performed by: INTERNAL MEDICINE

## 2021-01-08 ENCOUNTER — HOSPITAL ENCOUNTER (EMERGENCY)
Age: 61
Discharge: HOME | End: 2021-01-08
Attending: EMERGENCY MEDICINE
Payer: COMMERCIAL

## 2021-01-08 ENCOUNTER — APPOINTMENT (OUTPATIENT)
Dept: GENERAL RADIOLOGY | Age: 61
End: 2021-01-08
Attending: EMERGENCY MEDICINE
Payer: COMMERCIAL

## 2021-01-08 VITALS
SYSTOLIC BLOOD PRESSURE: 115 MMHG | WEIGHT: 120 LBS | RESPIRATION RATE: 17 BRPM | TEMPERATURE: 98 F | BODY MASS INDEX: 24.19 KG/M2 | DIASTOLIC BLOOD PRESSURE: 71 MMHG | OXYGEN SATURATION: 97 % | HEART RATE: 59 BPM | HEIGHT: 59 IN

## 2021-01-08 DIAGNOSIS — M25.512 ACUTE PAIN OF LEFT SHOULDER: ICD-10-CM

## 2021-01-08 DIAGNOSIS — R07.89 CHEST PAIN, ATYPICAL: Primary | ICD-10-CM

## 2021-01-08 LAB
ALBUMIN SERPL-MCNC: 3.7 G/DL (ref 3.4–5)
ALBUMIN/GLOB SERPL: 1 {RATIO} (ref 1–2)
ALP LIVER SERPL-CCNC: 119 U/L
ALT SERPL-CCNC: 28 U/L
ANION GAP SERPL CALC-SCNC: 6 MMOL/L (ref 0–18)
AST SERPL-CCNC: 28 U/L (ref 15–37)
BASOPHILS # BLD AUTO: 0.02 X10(3) UL (ref 0–0.2)
BASOPHILS NFR BLD AUTO: 0.4 %
BILIRUB SERPL-MCNC: 0.3 MG/DL (ref 0.1–2)
BUN BLD-MCNC: 17 MG/DL (ref 7–18)
BUN/CREAT SERPL: 18.5 (ref 10–20)
CALCIUM BLD-MCNC: 8.9 MG/DL (ref 8.5–10.1)
CHLORIDE SERPL-SCNC: 108 MMOL/L (ref 98–112)
CO2 SERPL-SCNC: 27 MMOL/L (ref 21–32)
CREAT BLD-MCNC: 0.92 MG/DL
DEPRECATED RDW RBC AUTO: 36.7 FL (ref 35.1–46.3)
EOSINOPHIL # BLD AUTO: 0.12 X10(3) UL (ref 0–0.7)
EOSINOPHIL NFR BLD AUTO: 2.6 %
ERYTHROCYTE [DISTWIDTH] IN BLOOD BY AUTOMATED COUNT: 11.5 % (ref 11–15)
GLOBULIN PLAS-MCNC: 3.8 G/DL (ref 2.8–4.4)
GLUCOSE BLD-MCNC: 96 MG/DL (ref 70–99)
HCT VFR BLD AUTO: 39.8 %
HGB BLD-MCNC: 13.8 G/DL
IMM GRANULOCYTES # BLD AUTO: 0.01 X10(3) UL (ref 0–1)
IMM GRANULOCYTES NFR BLD: 0.2 %
LYMPHOCYTES # BLD AUTO: 1.89 X10(3) UL (ref 1–4)
LYMPHOCYTES NFR BLD AUTO: 41 %
M PROTEIN MFR SERPL ELPH: 7.5 G/DL (ref 6.4–8.2)
MCH RBC QN AUTO: 30.3 PG (ref 26–34)
MCHC RBC AUTO-ENTMCNC: 34.7 G/DL (ref 31–37)
MCV RBC AUTO: 87.5 FL
MONOCYTES # BLD AUTO: 0.31 X10(3) UL (ref 0.1–1)
MONOCYTES NFR BLD AUTO: 6.7 %
NEUTROPHILS # BLD AUTO: 2.26 X10 (3) UL (ref 1.5–7.7)
NEUTROPHILS # BLD AUTO: 2.26 X10(3) UL (ref 1.5–7.7)
NEUTROPHILS NFR BLD AUTO: 49.1 %
OSMOLALITY SERPL CALC.SUM OF ELEC: 293 MOSM/KG (ref 275–295)
PLATELET # BLD AUTO: 216 10(3)UL (ref 150–450)
POTASSIUM SERPL-SCNC: 3.9 MMOL/L (ref 3.5–5.1)
RBC # BLD AUTO: 4.55 X10(6)UL
SODIUM SERPL-SCNC: 141 MMOL/L (ref 136–145)
TROPONIN I SERPL-MCNC: <0.045 NG/ML (ref ?–0.04)
WBC # BLD AUTO: 4.6 X10(3) UL (ref 4–11)

## 2021-01-08 PROCEDURE — 71045 X-RAY EXAM CHEST 1 VIEW: CPT | Performed by: EMERGENCY MEDICINE

## 2021-01-08 PROCEDURE — 99285 EMERGENCY DEPT VISIT HI MDM: CPT

## 2021-01-08 PROCEDURE — 84484 ASSAY OF TROPONIN QUANT: CPT | Performed by: EMERGENCY MEDICINE

## 2021-01-08 PROCEDURE — 73030 X-RAY EXAM OF SHOULDER: CPT | Performed by: EMERGENCY MEDICINE

## 2021-01-08 PROCEDURE — 80053 COMPREHEN METABOLIC PANEL: CPT | Performed by: EMERGENCY MEDICINE

## 2021-01-08 PROCEDURE — 96374 THER/PROPH/DIAG INJ IV PUSH: CPT

## 2021-01-08 PROCEDURE — 85025 COMPLETE CBC W/AUTO DIFF WBC: CPT | Performed by: EMERGENCY MEDICINE

## 2021-01-08 PROCEDURE — 93005 ELECTROCARDIOGRAM TRACING: CPT

## 2021-01-08 PROCEDURE — 93010 ELECTROCARDIOGRAM REPORT: CPT

## 2021-01-08 RX ORDER — KETOROLAC TROMETHAMINE 15 MG/ML
15 INJECTION, SOLUTION INTRAMUSCULAR; INTRAVENOUS ONCE
Status: COMPLETED | OUTPATIENT
Start: 2021-01-08 | End: 2021-01-08

## 2021-01-09 LAB
ATRIAL RATE: 67 BPM
P AXIS: 64 DEGREES
P-R INTERVAL: 170 MS
Q-T INTERVAL: 380 MS
QRS DURATION: 84 MS
QTC CALCULATION (BEZET): 401 MS
R AXIS: 24 DEGREES
T AXIS: 36 DEGREES
VENTRICULAR RATE: 67 BPM

## 2021-01-09 NOTE — ED INITIAL ASSESSMENT (HPI)
PT to the ED for evaluation of pain in her L arm every since she got her flu shot 2 months ago. PT states that tonight the pain began to get worse and she also felt pain in her L chest. No medications taken.  No redness or swelling noted to the deltoid area

## 2021-01-09 NOTE — ED PROVIDER NOTES
Patient Seen in: THE [facility] Emergency Department In [place]      History   Patient presents with:  Chest Pain Angina    Stated Complaint: Left-sided chest pain since yesterday.  She also c/o left arm pain after receivi*    HPI/Subjective:   HPI    65-year- Normocephalic atraumatic. Anicteric sclera. Oral mucosa is moist.  Oropharynx is normal.  Neck: There is left trapezius muscle tenderness and spasm on palpation. No adenopathy or thyromegaly. Lungs are clear to auscultation.   Heart exam: Normal S1-S2 w CHEST AP/PA (1 VIEW) (CPT=71045)  TECHNIQUE:  AP chest radiograph was obtained. COMPARISON:  None. INDICATIONS:  Left-sided chest pain since yesterday. She also c/o left arm pain after receiving her flu shot in the same arm.   PATIENT STATED HISTORY: (As Impression:  Chest pain, atypical  (primary encounter diagnosis)  Acute pain of left shoulder    Disposition:  Discharge  1/8/2021 10:35 pm    Follow-up:  Brad Patterson MD  [address] [place] [address] [facility]  [address] [phone]    Call in 3 d

## 2021-02-05 ENCOUNTER — OFFICE VISIT (OUTPATIENT)
Dept: INTERNAL MEDICINE CLINIC | Facility: CLINIC | Age: 61
End: 2021-02-05
Payer: COMMERCIAL

## 2021-02-05 VITALS
SYSTOLIC BLOOD PRESSURE: 120 MMHG | BODY MASS INDEX: 26.24 KG/M2 | TEMPERATURE: 98 F | RESPIRATION RATE: 16 BRPM | HEIGHT: 58 IN | OXYGEN SATURATION: 98 % | WEIGHT: 125 LBS | HEART RATE: 88 BPM | DIASTOLIC BLOOD PRESSURE: 80 MMHG

## 2021-02-05 DIAGNOSIS — Z12.11 SCREEN FOR COLON CANCER: ICD-10-CM

## 2021-02-05 DIAGNOSIS — Z12.31 VISIT FOR SCREENING MAMMOGRAM: ICD-10-CM

## 2021-02-05 DIAGNOSIS — R76.8 SS-A ANTIBODY POSITIVE: ICD-10-CM

## 2021-02-05 DIAGNOSIS — M75.82 TENDONITIS OF LEFT ROTATOR CUFF: ICD-10-CM

## 2021-02-05 DIAGNOSIS — Z00.00 ROUTINE GENERAL MEDICAL EXAMINATION AT A HEALTH CARE FACILITY: Primary | ICD-10-CM

## 2021-02-05 PROCEDURE — 3079F DIAST BP 80-89 MM HG: CPT | Performed by: INTERNAL MEDICINE

## 2021-02-05 PROCEDURE — 3074F SYST BP LT 130 MM HG: CPT | Performed by: INTERNAL MEDICINE

## 2021-02-05 PROCEDURE — 99396 PREV VISIT EST AGE 40-64: CPT | Performed by: INTERNAL MEDICINE

## 2021-02-05 PROCEDURE — 3008F BODY MASS INDEX DOCD: CPT | Performed by: INTERNAL MEDICINE

## 2021-02-05 NOTE — PROGRESS NOTES
Guerita Pagan is a 64year old female. HPI:   Patient presents for a physical exam and elton miguel noted below. 1. Developed left arm pain and left chest pain after she received flu shot in 10/2020.  Presented to ED on Jan, 2021 and I reviewed imag use: No    Drug use: No        EXAM:   There were no vitals taken for this visit.   GENERAL: A&O well developed, well nourished,in no apparent distress  SKIN: no rashes,no suspicious lesions  HEENT: atraumatic,  NECK: supple, no jvd, no thyromegaly  LUNGS: neg in 2018    The patient indicates understanding of these issues and agrees to the plan. The patient is asked to return in 1 year for CPX.    Deo Gonsales MD

## 2021-02-05 NOTE — PATIENT INSTRUCTIONS
Está atrasado para Kelsea Nath. [place] es muy importante para prevenir el cáncer de mama. Por favor, complete los laboratorios de ayuno en julio de 2021. Las he ordenado.     Debe susie reumatología con respecto a leonard análisis de laboratorio anormales ant tendinitis del manguito de los rotadores? Cuando los tendones del maguito de los rotadores se lesiona o se [place] en exceso, esto provoca tendinitis.  La causa más común de lesión es jignesh actividad en la que el brazo se mueve repetidamente por encima de la cab congelado. Para ayudar a prevenir esto, siga las instrucciones que le den para hacer reposo [place] y para hacer ejercicios que ayuden a bass hombro a recuperarse.    Cuándo llamar a bass proveedor de atención médica  Llame a bass proveedor [address] i brazos cerca del cuerpo. 2. Lentamente empuje el kyra o el bastón hacia la derecha con bass mano izquierda. Siga sosteniendo el kyra o el bastón con ambas mike. Mantenga los codos cerca del cuerpo. Sienta el estiramiento en el hombro derecho.  Deténgase solis rotadores). · Cirugía anterior de reemplazo total de hombro que no [place] [employer] [place]. · Dolor intenso y problemas para  bass hombro.   · Debbie Hallmark del alivio de leonard síntomas con otros tratamientos santiago descanso, medicamentos, inyecciones de cortisona o

## 2021-04-16 ENCOUNTER — LAB ENCOUNTER (OUTPATIENT)
Dept: LAB | Age: 61
End: 2021-04-16
Attending: STUDENT IN AN ORGANIZED HEALTH CARE EDUCATION/TRAINING PROGRAM
Payer: COMMERCIAL

## 2021-04-16 DIAGNOSIS — Z01.818 PRE-OP TESTING: ICD-10-CM

## 2021-04-19 PROBLEM — Z12.11 SPECIAL SCREENING FOR MALIGNANT NEOPLASM OF COLON: Status: ACTIVE | Noted: 2021-04-19

## 2021-08-24 ENCOUNTER — OFFICE VISIT (OUTPATIENT)
Dept: INTERNAL MEDICINE CLINIC | Facility: CLINIC | Age: 61
End: 2021-08-24
Payer: COMMERCIAL

## 2021-08-24 VITALS
HEART RATE: 88 BPM | OXYGEN SATURATION: 96 % | TEMPERATURE: 99 F | BODY MASS INDEX: 26.08 KG/M2 | DIASTOLIC BLOOD PRESSURE: 70 MMHG | RESPIRATION RATE: 16 BRPM | WEIGHT: 124.25 LBS | SYSTOLIC BLOOD PRESSURE: 122 MMHG | HEIGHT: 58 IN

## 2021-08-24 DIAGNOSIS — I83.893 SYMPTOMATIC VARICOSE VEINS OF BOTH LOWER EXTREMITIES: Primary | ICD-10-CM

## 2021-08-24 PROCEDURE — 3078F DIAST BP <80 MM HG: CPT | Performed by: INTERNAL MEDICINE

## 2021-08-24 PROCEDURE — 3008F BODY MASS INDEX DOCD: CPT | Performed by: INTERNAL MEDICINE

## 2021-08-24 PROCEDURE — 99213 OFFICE O/P EST LOW 20 MIN: CPT | Performed by: INTERNAL MEDICINE

## 2021-08-24 PROCEDURE — 3074F SYST BP LT 130 MM HG: CPT | Performed by: INTERNAL MEDICINE

## 2021-08-24 NOTE — PROGRESS NOTES
Keyon Hernandes is a 63year old female. HPI:   Patient presents for the following issues.  ID # [id number]  1. RLE varicose veins: she thinks one of her veins bursted and left a bruise. Felt like she was hit by something.  But she does not recall an Use: Never used    Alcohol use: No    Drug use: No          EXAM:   /70   Pulse 88   Temp 98.9 °F (37.2 °C)   Resp 16   Ht 4' 10\" (1.473 m)   Wt 124 lb 4 oz (56.4 kg)   SpO2 96%   BMI 25.97 kg/m²   GENERAL: A&O well developed, well nourished,in no a

## 2021-08-24 NOTE — PATIENT INSTRUCTIONS
Está atrasado para leonard laboratorios de [place]. Por favor, complételos lo antes posible. Complete bass mamografía lo antes posible. Te aconsejo que te pongas la vacuna anual contra la gripe cada septiembre y [place].     [place]  You are overdue for you

## 2023-07-31 ENCOUNTER — LAB ENCOUNTER (OUTPATIENT)
Dept: LAB | Age: 63
End: 2023-07-31
Attending: STUDENT IN AN ORGANIZED HEALTH CARE EDUCATION/TRAINING PROGRAM
Payer: COMMERCIAL

## 2023-07-31 ENCOUNTER — OFFICE VISIT (OUTPATIENT)
Dept: FAMILY MEDICINE CLINIC | Facility: CLINIC | Age: 63
End: 2023-07-31
Payer: COMMERCIAL

## 2023-07-31 ENCOUNTER — HOSPITAL ENCOUNTER (OUTPATIENT)
Dept: GENERAL RADIOLOGY | Age: 63
Discharge: HOME | End: 2023-07-31
Attending: STUDENT IN AN ORGANIZED HEALTH CARE EDUCATION/TRAINING PROGRAM
Payer: COMMERCIAL

## 2023-07-31 VITALS
DIASTOLIC BLOOD PRESSURE: 60 MMHG | SYSTOLIC BLOOD PRESSURE: 130 MMHG | HEART RATE: 62 BPM | BODY MASS INDEX: 24.39 KG/M2 | WEIGHT: 121 LBS | TEMPERATURE: 97 F | HEIGHT: 59 IN | RESPIRATION RATE: 16 BRPM

## 2023-07-31 DIAGNOSIS — M54.50 CHRONIC LEFT-SIDED LOW BACK PAIN WITHOUT SCIATICA: ICD-10-CM

## 2023-07-31 DIAGNOSIS — G89.29 CHRONIC LEFT-SIDED LOW BACK PAIN WITHOUT SCIATICA: ICD-10-CM

## 2023-07-31 DIAGNOSIS — G43.109 MIGRAINE WITH AURA AND WITHOUT STATUS MIGRAINOSUS, NOT INTRACTABLE: ICD-10-CM

## 2023-07-31 DIAGNOSIS — M62.830 LUMBAR PARASPINAL MUSCLE SPASM: ICD-10-CM

## 2023-07-31 DIAGNOSIS — R51.9 WORSENING HEADACHES: ICD-10-CM

## 2023-07-31 DIAGNOSIS — R51.9 WORSENING HEADACHES: Primary | ICD-10-CM

## 2023-07-31 LAB
ALBUMIN SERPL-MCNC: 3.7 G/DL (ref 3.4–5)
ALBUMIN/GLOB SERPL: 1 {RATIO} (ref 1–2)
ALP LIVER SERPL-CCNC: 97 U/L
ALT SERPL-CCNC: 26 U/L
ANION GAP SERPL CALC-SCNC: 2 MMOL/L (ref 0–18)
AST SERPL-CCNC: 18 U/L (ref 15–37)
BILIRUB SERPL-MCNC: 0.5 MG/DL (ref 0.1–2)
BUN BLD-MCNC: 17 MG/DL (ref 7–18)
CALCIUM BLD-MCNC: 9 MG/DL (ref 8.5–10.1)
CHLORIDE SERPL-SCNC: 110 MMOL/L (ref 98–112)
CHOLEST SERPL-MCNC: 181 MG/DL (ref ?–200)
CO2 SERPL-SCNC: 27 MMOL/L (ref 21–32)
CREAT BLD-MCNC: 0.99 MG/DL
EGFRCR SERPLBLD CKD-EPI 2021: 65 ML/MIN/1.73M2 (ref 60–?)
ERYTHROCYTE [DISTWIDTH] IN BLOOD BY AUTOMATED COUNT: 12.2 %
FASTING PATIENT LIPID ANSWER: YES
FASTING STATUS PATIENT QL REPORTED: YES
GLOBULIN PLAS-MCNC: 3.8 G/DL (ref 2.8–4.4)
GLUCOSE BLD-MCNC: 89 MG/DL (ref 70–99)
HCT VFR BLD AUTO: 40.5 %
HDLC SERPL-MCNC: 67 MG/DL (ref 40–59)
HGB BLD-MCNC: 14 G/DL
LDLC SERPL CALC-MCNC: 101 MG/DL (ref ?–100)
MCH RBC QN AUTO: 30 PG (ref 26–34)
MCHC RBC AUTO-ENTMCNC: 34.6 G/DL (ref 31–37)
MCV RBC AUTO: 86.9 FL
NONHDLC SERPL-MCNC: 114 MG/DL (ref ?–130)
OSMOLALITY SERPL CALC.SUM OF ELEC: 289 MOSM/KG (ref 275–295)
PLATELET # BLD AUTO: 231 10(3)UL (ref 150–450)
POTASSIUM SERPL-SCNC: 4.1 MMOL/L (ref 3.5–5.1)
PROT SERPL-MCNC: 7.5 G/DL (ref 6.4–8.2)
RBC # BLD AUTO: 4.66 X10(6)UL
SODIUM SERPL-SCNC: 139 MMOL/L (ref 136–145)
TRIGL SERPL-MCNC: 67 MG/DL (ref 30–149)
TSI SER-ACNC: 1.02 MIU/ML (ref 0.36–3.74)
VLDLC SERPL CALC-MCNC: 11 MG/DL (ref 0–30)
WBC # BLD AUTO: 5.3 X10(3) UL (ref 4–11)

## 2023-07-31 PROCEDURE — 99214 OFFICE O/P EST MOD 30 MIN: CPT | Performed by: STUDENT IN AN ORGANIZED HEALTH CARE EDUCATION/TRAINING PROGRAM

## 2023-07-31 PROCEDURE — 72100 X-RAY EXAM L-S SPINE 2/3 VWS: CPT | Performed by: STUDENT IN AN ORGANIZED HEALTH CARE EDUCATION/TRAINING PROGRAM

## 2023-07-31 PROCEDURE — 80061 LIPID PANEL: CPT

## 2023-07-31 PROCEDURE — 3008F BODY MASS INDEX DOCD: CPT | Performed by: STUDENT IN AN ORGANIZED HEALTH CARE EDUCATION/TRAINING PROGRAM

## 2023-07-31 PROCEDURE — 36415 COLL VENOUS BLD VENIPUNCTURE: CPT

## 2023-07-31 PROCEDURE — 84443 ASSAY THYROID STIM HORMONE: CPT

## 2023-07-31 PROCEDURE — 3078F DIAST BP <80 MM HG: CPT | Performed by: STUDENT IN AN ORGANIZED HEALTH CARE EDUCATION/TRAINING PROGRAM

## 2023-07-31 PROCEDURE — 80053 COMPREHEN METABOLIC PANEL: CPT

## 2023-07-31 PROCEDURE — 3075F SYST BP GE 130 - 139MM HG: CPT | Performed by: STUDENT IN AN ORGANIZED HEALTH CARE EDUCATION/TRAINING PROGRAM

## 2023-07-31 PROCEDURE — 85027 COMPLETE CBC AUTOMATED: CPT

## 2023-07-31 NOTE — PROGRESS NOTES
Lumbar DDD noted.  Will plan to discuss results and further recommendations during upcoming follow-up appointment

## 2023-10-04 ENCOUNTER — HOSPITAL ENCOUNTER (OUTPATIENT)
Dept: CT IMAGING | Facility: HOSPITAL | Age: 63
Discharge: HOME | End: 2023-10-04
Attending: STUDENT IN AN ORGANIZED HEALTH CARE EDUCATION/TRAINING PROGRAM
Payer: COMMERCIAL

## 2023-10-04 ENCOUNTER — LAB ENCOUNTER (OUTPATIENT)
Dept: LAB | Facility: HOSPITAL | Age: 63
End: 2023-10-04
Attending: STUDENT IN AN ORGANIZED HEALTH CARE EDUCATION/TRAINING PROGRAM
Payer: COMMERCIAL

## 2023-10-04 ENCOUNTER — OFFICE VISIT (OUTPATIENT)
Dept: FAMILY MEDICINE CLINIC | Facility: CLINIC | Age: 63
End: 2023-10-04
Payer: COMMERCIAL

## 2023-10-04 VITALS
BODY MASS INDEX: 24.64 KG/M2 | OXYGEN SATURATION: 99 % | RESPIRATION RATE: 16 BRPM | DIASTOLIC BLOOD PRESSURE: 70 MMHG | HEIGHT: 59 IN | TEMPERATURE: 97 F | SYSTOLIC BLOOD PRESSURE: 120 MMHG | HEART RATE: 69 BPM | WEIGHT: 122.25 LBS

## 2023-10-04 DIAGNOSIS — R10.13 EPIGASTRIC PAIN: ICD-10-CM

## 2023-10-04 DIAGNOSIS — R19.8 ABDOMINAL GUARDING: ICD-10-CM

## 2023-10-04 DIAGNOSIS — R10.84 GENERALIZED ABDOMINAL PAIN: Primary | ICD-10-CM

## 2023-10-04 DIAGNOSIS — R10.2 SUPRAPUBIC PAIN: ICD-10-CM

## 2023-10-04 DIAGNOSIS — R10.84 GENERALIZED ABDOMINAL PAIN: ICD-10-CM

## 2023-10-04 DIAGNOSIS — Z90.49 S/P CHOLECYSTECTOMY: ICD-10-CM

## 2023-10-04 LAB
ALBUMIN SERPL-MCNC: 3.9 G/DL (ref 3.4–5)
ALBUMIN/GLOB SERPL: 1 {RATIO} (ref 1–2)
ALP LIVER SERPL-CCNC: 114 U/L
ALT SERPL-CCNC: 32 U/L
AMYLASE SERPL-CCNC: 49 U/L (ref 25–115)
ANION GAP SERPL CALC-SCNC: 5 MMOL/L (ref 0–18)
APPEARANCE: CLEAR
AST SERPL-CCNC: 25 U/L (ref 15–37)
BILIRUB SERPL-MCNC: 0.3 MG/DL (ref 0.1–2)
BILIRUBIN: NEGATIVE
BUN BLD-MCNC: 23 MG/DL (ref 7–18)
CALCIUM BLD-MCNC: 9 MG/DL (ref 8.5–10.1)
CHLORIDE SERPL-SCNC: 107 MMOL/L (ref 98–112)
CO2 SERPL-SCNC: 30 MMOL/L (ref 21–32)
CREAT BLD-MCNC: 1 MG/DL
EGFRCR SERPLBLD CKD-EPI 2021: 64 ML/MIN/1.73M2 (ref 60–?)
FASTING STATUS PATIENT QL REPORTED: YES
GLOBULIN PLAS-MCNC: 4 G/DL (ref 2.8–4.4)
GLUCOSE (URINE DIPSTICK): NEGATIVE MG/DL
GLUCOSE BLD-MCNC: 94 MG/DL (ref 70–99)
KETONES (URINE DIPSTICK): NEGATIVE MG/DL
LEUKOCYTES: NEGATIVE
LIPASE SERPL-CCNC: 82 U/L (ref 13–75)
NITRITE, URINE: NEGATIVE
OCCULT BLOOD: NEGATIVE
OSMOLALITY SERPL CALC.SUM OF ELEC: 297 MOSM/KG (ref 275–295)
PH, URINE: 5.5 (ref 4.5–8)
POTASSIUM SERPL-SCNC: 4 MMOL/L (ref 3.5–5.1)
PROT SERPL-MCNC: 7.9 G/DL (ref 6.4–8.2)
PROTEIN (URINE DIPSTICK): NEGATIVE MG/DL
SODIUM SERPL-SCNC: 142 MMOL/L (ref 136–145)
SPECIFIC GRAVITY: 1.01 (ref 1–1.03)
URINE-COLOR: YELLOW
UROBILINOGEN,SEMI-QN: 0.2 MG/DL (ref 0–1.9)

## 2023-10-04 PROCEDURE — 99214 OFFICE O/P EST MOD 30 MIN: CPT | Performed by: STUDENT IN AN ORGANIZED HEALTH CARE EDUCATION/TRAINING PROGRAM

## 2023-10-04 PROCEDURE — 74176 CT ABD & PELVIS W/O CONTRAST: CPT | Performed by: STUDENT IN AN ORGANIZED HEALTH CARE EDUCATION/TRAINING PROGRAM

## 2023-10-04 PROCEDURE — 83690 ASSAY OF LIPASE: CPT

## 2023-10-04 PROCEDURE — 3074F SYST BP LT 130 MM HG: CPT | Performed by: STUDENT IN AN ORGANIZED HEALTH CARE EDUCATION/TRAINING PROGRAM

## 2023-10-04 PROCEDURE — 82150 ASSAY OF AMYLASE: CPT

## 2023-10-04 PROCEDURE — 3078F DIAST BP <80 MM HG: CPT | Performed by: STUDENT IN AN ORGANIZED HEALTH CARE EDUCATION/TRAINING PROGRAM

## 2023-10-04 PROCEDURE — 3008F BODY MASS INDEX DOCD: CPT | Performed by: STUDENT IN AN ORGANIZED HEALTH CARE EDUCATION/TRAINING PROGRAM

## 2023-10-04 PROCEDURE — 81003 URINALYSIS AUTO W/O SCOPE: CPT | Performed by: STUDENT IN AN ORGANIZED HEALTH CARE EDUCATION/TRAINING PROGRAM

## 2023-10-04 PROCEDURE — 80053 COMPREHEN METABOLIC PANEL: CPT

## 2023-10-04 PROCEDURE — 36415 COLL VENOUS BLD VENIPUNCTURE: CPT

## 2023-10-05 NOTE — PROGRESS NOTES
Good morning Connie Hassan,     Your lab and CT results were reviewed and were reassuring. Based on these results, your symptoms are likely due to a viral infection (the stomach flu) and dehydration. You can take immodium as needed for diarrhea, pepcid as needed for upper stomach pain, and peptobismol as needed. You should also increase your fluid intake. If your symptoms do not improve within the next week or if they worsen, please follow up. Please let me know if you have any questions.      Dr. Denia Mcmillan

## 2024-08-08 NOTE — TELEPHONE ENCOUNTER
Spoke with patient to inform that with patient current insurance [employer], [facility] is no longer accepted. Patient will need to contact insurance for in network participant. If patient has other insurance to also check for in network participant before scheduling. Patient understands and thankful for the call.      Tried calling pt., unable to reach pt. Mailbox full. Will try again later.  mp

## 2025-05-05 ENCOUNTER — APPOINTMENT (OUTPATIENT)
Dept: GENERAL RADIOLOGY | Age: 65
End: 2025-05-05
Attending: NURSE PRACTITIONER
Payer: COMMERCIAL

## 2025-05-05 ENCOUNTER — HOSPITAL ENCOUNTER (OUTPATIENT)
Age: 65
Discharge: HOME | End: 2025-05-05
Attending: EMERGENCY MEDICINE
Payer: COMMERCIAL

## 2025-05-05 ENCOUNTER — APPOINTMENT (OUTPATIENT)
Dept: ULTRASOUND IMAGING | Age: 65
End: 2025-05-05
Attending: NURSE PRACTITIONER
Payer: COMMERCIAL

## 2025-05-05 VITALS
SYSTOLIC BLOOD PRESSURE: 119 MMHG | TEMPERATURE: 99 F | OXYGEN SATURATION: 97 % | RESPIRATION RATE: 16 BRPM | BODY MASS INDEX: 23 KG/M2 | HEART RATE: 70 BPM | DIASTOLIC BLOOD PRESSURE: 71 MMHG | WEIGHT: 116 LBS

## 2025-05-05 DIAGNOSIS — S69.91XA INJURY OF RIGHT WRIST, INITIAL ENCOUNTER: Primary | ICD-10-CM

## 2025-05-05 LAB
#MXD IC: 0.3 X10ˆ3/UL (ref 0.1–1)
BUN BLD-MCNC: 15 MG/DL (ref 7–18)
CHLORIDE BLD-SCNC: 101 MMOL/L (ref 98–112)
CO2 BLD-SCNC: 27 MMOL/L (ref 21–32)
CREAT BLD-MCNC: 0.9 MG/DL (ref 0.55–1.02)
EGFRCR SERPLBLD CKD-EPI 2021: 72 ML/MIN/1.73M2 (ref 60–?)
GLUCOSE BLD-MCNC: 106 MG/DL (ref 70–99)
HCT VFR BLD AUTO: 38.7 % (ref 35–48)
HCT VFR BLD CALC: 38 % (ref 34–50)
HGB BLD-MCNC: 13 G/DL (ref 12–16)
ISTAT IONIZED CALCIUM FOR CHEM 8: 1.16 MMOL/L (ref 1.12–1.32)
LYMPHOCYTES # BLD AUTO: 1 X10ˆ3/UL (ref 1–4)
LYMPHOCYTES NFR BLD AUTO: 15.4 %
MCH RBC QN AUTO: 30.2 PG (ref 26–34)
MCHC RBC AUTO-ENTMCNC: 33.6 G/DL (ref 31–37)
MCV RBC AUTO: 89.8 FL (ref 80–100)
MIXED CELL %: 4.5 %
NEUTROPHILS # BLD AUTO: 5 X10ˆ3/UL (ref 1.5–7.7)
NEUTROPHILS NFR BLD AUTO: 80.1 %
PLATELET # BLD AUTO: 178 X10ˆ3/UL (ref 150–450)
POTASSIUM BLD-SCNC: 3.7 MMOL/L (ref 3.6–5.1)
RBC # BLD AUTO: 4.31 X10ˆ6/UL (ref 3.8–5.3)
SODIUM BLD-SCNC: 143 MMOL/L (ref 136–145)
WBC # BLD AUTO: 6.3 X10ˆ3/UL (ref 4–11)

## 2025-05-05 PROCEDURE — 80047 BASIC METABLC PNL IONIZED CA: CPT

## 2025-05-05 PROCEDURE — 93971 EXTREMITY STUDY: CPT | Performed by: NURSE PRACTITIONER

## 2025-05-05 PROCEDURE — 73110 X-RAY EXAM OF WRIST: CPT | Performed by: NURSE PRACTITIONER

## 2025-05-05 PROCEDURE — 85025 COMPLETE CBC W/AUTO DIFF WBC: CPT | Performed by: NURSE PRACTITIONER

## 2025-05-05 PROCEDURE — 99214 OFFICE O/P EST MOD 30 MIN: CPT

## 2025-05-05 PROCEDURE — 36415 COLL VENOUS BLD VENIPUNCTURE: CPT

## 2025-05-05 RX ORDER — IBUPROFEN 600 MG/1
600 TABLET, FILM COATED ORAL ONCE
Status: COMPLETED | OUTPATIENT
Start: 2025-05-05 | End: 2025-05-05

## 2025-05-05 RX ORDER — IBUPROFEN 600 MG/1
600 TABLET, FILM COATED ORAL EVERY 8 HOURS PRN
Qty: 30 TABLET | Refills: 0 | Status: SHIPPED | OUTPATIENT
Start: 2025-05-05 | End: 2025-05-15

## 2025-05-05 RX ORDER — ACETAMINOPHEN 500 MG
1000 TABLET ORAL ONCE
Status: COMPLETED | OUTPATIENT
Start: 2025-05-05 | End: 2025-05-05

## 2025-05-05 NOTE — DISCHARGE INSTRUCTIONS
RICE:     Rest and elevate the affected extremity. Apply ice 4 times daily with a cloth barrier to reduce swelling.  You may wear an Ace bandage for comfort and support and to help reduce swelling.  You may take ibuprofen 600mg every 6 hours as needed for pain.  You may also take Tylenol 1000mg every 6 hours as needed for pain.  Follow-up with your primary care physician in 2-3 days as needed.  Return to the emergency room if you develop new or worsening symptoms.    To schedule an appointment with the Orthopedic and Sports Medicine department; please text or call [phone] and choose option 3 when prompted.

## 2025-05-05 NOTE — ED PROVIDER NOTES
Patient Seen in: [facility]      History     Chief Complaint   Patient presents with    Arm or Hand Injury     Stated Complaint: Swollen Hand    Subjective:   HPI    Patient is a 64-year-old female who is here today with right wrist swelling and pain that started on Saturday after cleaning a house.  Reports that she does not remember injuring the wrist.  She reports that the pain has become more significant over the last 24 hours.  She has been taking some Tylenol with no relief of her symptoms.  She reports that she is having difficulty moving her wrist because of the pain and the swelling.  Patient denies any fevers, chills, injury, trauma.  History of Present Illness               Objective:     Past Medical History:    Migraine              Past Surgical History:   Procedure Laterality Date    Cholecystectomy        1985                Social History     Socioeconomic History    Marital status:    Tobacco Use    Smoking status: Never    Smokeless tobacco: Never   Vaping Use    Vaping status: Never Used   Substance and Sexual Activity    Alcohol use: No    Drug use: No   Other Topics Concern    Caffeine Concern Yes     Comment: 1 cup a day    Exercise Yes     Comment: neck exercises at home    Special Diet No              Review of Systems    Positive for stated complaint: Swollen Hand  Other systems are as noted in HPI.  Constitutional and vital signs reviewed.      All other systems reviewed and negative except as noted above.                  Physical Exam     ED Triage Vitals [25 1026]   /71   Pulse 70   Resp 16   Temp 98.8 °F (37.1 °C)   Temp src    SpO2 97 %   O2 Device        Current Vitals:   No data recorded      Physical Exam  VS: Vital signs reviewed. O2 saturation within normal limits for this patient     General: Patient is awake and alert, oriented to person, place and time. Not in acute distress.      HEENT: Head is normocephalic atraumatic. Pupils  reactive bilaterally.  EOMs intact.  No facial droop or slurred speech.  No oral pallor. Mucous membranes moist.      Neck: No cervical lymphadenopathy. No stridor. Supple. No meningsmus.      Heart: S1-S2.  Regular rate and rhythm.       Lungs: good inspiratory effort. +air entry bilaterally without wheezes, rhonchi, crackles.  No accessory muscle use or tachypnea.       Abdomen: Soft, nontender, nondistended.  Active bowel sounds present.       Back: No CVA tenderness.       Right upper Extremity: No obvious deformity.  There is significant swelling. There is tenderness to wrist circumferentially.  There is no warmth to touch, she does have good passive range of motion however active range of motion is limited due to pain.  There are no open wounds.  There is no streaking.  Patient has positive pulses.  ROM intact to the shoulder, elbow,  and digits,Distal capillary refill takes less than 2 seconds. Radial pulses are 2+ bilaterally.  Distal sensation and motor intact.    Skin: Normal skin turgor     CNS: Moves all 4 extremities.  Interacts appropriately.  No tremor.  No gait abnormality    Differential diagnosis: Fracture, gout, DVT, sprain    Physical Exam                ED Course     Labs Reviewed   POCT ISTAT CHEM8 CARTRIDGE - Abnormal; Notable for the following components:       Result Value    ISTAT Glucose 106 (*)     All other components within normal limits   POCT CBC          Results            I have personally  reviewed available prior medical records for any recent pertinent discharge summaries/testing. Patient/family updated on results and plan, a verbalized understanding and agreement with the plan.  I explained to the patient that emergent conditions may arise and to go to the ER for new, worsening or any persistent conditions. I've explained the importance of taking all medicatons as prescribed, follow up, and return precuations,  All questions answered.    Please note that this report has been  produced using speech recognition software and may contain errors related to that system including, but not limited to, errors in grammar, punctuation, and spelling, as well as words and phrases that possibly may have been recognized inappropriately.  If there are any questions or concerns, contact the dictating provider for clarification.               MDM      Patient is a 64-year-old female who is here today with right wrist pain that started after cleaning a house on Saturday.  She had no injury or trauma however an x-ray was completed.  The x-ray shows no evidence of a fracture but there is a widening of the scaphoid lunate joint space, which might be a ligamentous injury.  We did an ultrasound of the arm which shows negative for DVT, negative for superficial thrombosis.  I placed the patient in a Velcro splint, she was neurovascularly intact before and after the splint application.  I did discuss return and follow-up precautions, following up with orthopedics, keeping the splint on for comfort, elevating and icing the area.  Taking ibuprofen or NSAIDs for pain.  Patient was discharged home in stable condition to follow-up with a primary care, Ortho and to return to the emergency department with any worsening symptoms or concerns    This patient was discussed with my attending physician prior to dispo.    Medical Decision Making      Disposition and Plan     Clinical Impression:  1. Injury of right wrist, initial encounter         Disposition:  Discharge  5/5/2025 11:21 am    Follow-up:  Ellen Fishman MD  [address] [place] [address] [address]  [facility] [address]  [phone]          Solomon Galindo MD  [address] [place] [address]  [phone]                Medications Prescribed:  Discharge Medication List as of 5/5/2025 11:26 AM        START taking these medications    Details   ibuprofen 600 MG Oral Tab Take 1 tablet (600 mg total) by mouth every 8 (eight) hours as needed for Pain or  Fever., Normal, Disp-30 tablet, R-0             Supplementary Documentation:

## 2025-05-05 NOTE — ED PROVIDER NOTES
I reviewed that chart and discussed the case.  I have examined the patient and noted tenderness dorsal aspect of the wrist.  No pain with passive range of motion.  Mild pain with active range of motion.  Slight erythema swelling dorsal aspect of the wrist.  Good .  Sensation intact to light touch.  No definite warmth.    Wrist x-rayNo evidence of acute fracture.  Widening of the scapholunate joint space may be sequelae of ligamentous injury.  Calcifications in the joint spaces may be due to CPPD.  MRI without contrast may be done for further evaluation.   Independent reviewed by myself, no definite fracture    Right arm ultrasound1. Negative DVT study.   2. Negative for superficial thrombus.        Patient had normal white count.  Patient does have tenderness dorsal aspect of the wrist.  X-ray concerning for possible scapholunate widening.  Patient was given a wrist splint.  Patient be referred to hand specialist for further evaluation.  Recommend anti-inflammatories, splint.  With the emergency department if fever above 100.3, new or worse symptoms.  I did consider scapholunate ligament injury, wrist fracture, inflammatory arthritis.     I did the substantive portion of the medical decision making.     I agree with the following clinical impression(s):  Injury of right wrist, initial encounter  (primary encounter diagnosis).     I agree with the plan as noted.

## 2025-05-05 NOTE — ED INITIAL ASSESSMENT (HPI)
Pt presents with swelling to R wrist radiating into R hand and up are to R shoulder, pt vivek injury stating that she was cleaning on Saturday evening and the pain started after

## 2025-05-06 ENCOUNTER — OFFICE VISIT (OUTPATIENT)
Dept: ORTHOPEDICS CLINIC | Facility: CLINIC | Age: 65
End: 2025-05-06
Payer: COMMERCIAL

## 2025-05-06 VITALS — HEIGHT: 59 IN | WEIGHT: 116 LBS | BODY MASS INDEX: 23.39 KG/M2

## 2025-05-06 DIAGNOSIS — S69.91XA INJURY OF RIGHT SCAPHOLUNATE LIGAMENT WITH NO INSTABILITY, INITIAL ENCOUNTER: ICD-10-CM

## 2025-05-06 DIAGNOSIS — M25.841: Primary | ICD-10-CM

## 2025-05-06 PROCEDURE — 3008F BODY MASS INDEX DOCD: CPT | Performed by: PHYSICIAN ASSISTANT

## 2025-05-06 PROCEDURE — 99203 OFFICE O/P NEW LOW 30 MIN: CPT | Performed by: PHYSICIAN ASSISTANT

## 2025-05-06 RX ORDER — MELOXICAM 15 MG/1
15 TABLET ORAL DAILY
Qty: 30 TABLET | Refills: 0 | Status: SHIPPED | OUTPATIENT
Start: 2025-05-06 | End: 2025-06-05

## 2025-05-06 NOTE — H&P
[facility] - ORTHOPEDICS  [address] [place] [address]  [phone]     NEW PATIENT VISIT - HISTORY AND PHYSICAL EXAMINATION     Name: Keisha Gonzalez   MRN: [id number]  Date: 5/6/2025     CC: Right hand/wrist pain.     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 7/10 pain. She complains of swelling. She notes less than 50% of normal function.     PMH:   Past Medical History[1]    PAST SURGICAL HX:  Past Surgical History[2]    FAMILY HX:  Family History[3]    ALLERGIES:  Patient has no known allergies.    MEDICATIONS: Current Medications[4]    ROS: A complete 10 point review of systems performed and negative aside from the aforementioned per history of present illness.     SOCIAL HX:  Social History     Occupational History    Not on file   Tobacco Use    Smoking status: Never    Smokeless tobacco: Never   Vaping Use    Vaping status: Never Used   Substance and Sexual Activity    Alcohol use: No    Drug use: No    Sexual activity: Not on file         PE:   Vitals:    05/06/25 1246   Weight: 116 lb (52.6 kg)   Height: 4' 11\" (1.499 m)     Estimated body mass index is 23.43 kg/m² as calculated from the following:    Height as of this encounter: 4' 11\" (1.499 m).    Weight as of this encounter: 116 lb (52.6 kg).    Physical Exam  Constitutional:       Appearance: Normal appearance.   HENT:      Head: Normocephalic and atraumatic.   Eyes:      Extraocular Movements: Extraocular movements intact.   Neck:      Musculoskeletal: Normal range of motion and neck supple.   Cardiovascular:      Pulses: Normal pulses.   Pulmonary:      Effort: Pulmonary effort is normal. No respiratory distress.   Abdominal:      General: There is no distension.   Skin:     General: Skin is warm.       Capillary Refill: Capillary refill takes less than 2 seconds.      Findings: No bruising.   Neurological:      General: No focal deficit present.      Mental Status: Alert.   Psychiatric:         Mood and Affect: Mood normal.     Examination of the right hand/wrist demonstrates:     Skin is intact, warm and dry.     Inspection:   Atrophy: none    Effusion: none    Edema: none    Erythema: none    Hematoma: none    Nail changes:  none    Deformities: none      Palpation:   Radius: none    Ulna: none    Scaphoid: none    Lunate:  none    Triquetrum: none    Pisiform: none    Trapezium: none    Trapezoid: none    Capitate: none  Hamate: none   DRUJ: none   Dorsal Wrist: none   Carrero Wrist: none   Metacarpals none   DIP: none   PIP: none   +scaholunate interval   ROM:   Wrist: Full and symmetric   Fingers: Full and symmetric     Strength: normal     Special Tests:   Median Nerve: Negative  Ulnar Nerve: Negative   Radial Nerve:  Negative     No obvious peripheral edema noted.   Distal neurovascular exam demonstrates normal perfusion, intact sensation to light touch and full strength.           Radiographic Examination/Diagnostics:    I personally viewed, independently interpreted and radiology report was reviewed.    US VENOUS DOPPLER ARM RIGHT - DIAG IMG (CPT=93971)  Result Date: 5/5/2025  PROCEDURE:  US VENOUS DOPPLER ARM RIGHT - DIAG IMG (CPT=93971)  COMPARISON:  None.  INDICATIONS:  Swollen Hand  TECHNIQUE:  Real time, grey scale, and duplex ultrasound was used to evaluate the upper extremity venous system. B-mode two-dimensional images of the vascular structures, Doppler spectral analysis, and color flow.  Doppler imaging were performed.  The following veins were imaged:  Subclavian, Jugular, Axillary, Brachial, Basilic, Cephalic, and the contralateral Subclavian and Jugular.  PATIENT STATED HISTORY: (As transcribed by Technologist)     FINDINGS:  The internal jugular, subclavian and axillary veins segments are  patent.  Compression and flow demonstrated in the brachial, basilic and cephalic veins.               CONCLUSION:  1. Negative DVT study. 2. Negative for superficial thrombus.    [place]  [facility]   Dictated by (CST): Srinivasan Johnson MD on 5/05/2025 at 11:05 AM     Finalized by (CST): Srinivasan Johnson MD on 5/05/2025 at 11:06 AM       XR WRIST COMPLETE (MIN 3 VIEWS), RIGHT (CPT=73110)  Result Date: 5/5/2025            PROCEDURE:  XR WRIST COMPLETE (MIN 3 VIEWS), RIGHT (CPT=73110)  TECHNIQUE:  Three views were obtained.  COMPARISON:  None.  INDICATIONS:  Swollen Hand  PATIENT STATED HISTORY: (As transcribed by Technologist)  wrist pain and swelling, no injury    FINDINGS: No fracture or dislocation is identified. Joint spaces are well maintained. Calcifications in the scapholunate joint space is noted.  Widening of the scapholunate joint space.  Calcifications in the ulnar carpal joint space. IMPRESSION: No evidence of acute fracture.  Widening of the scapholunate joint space may be sequelae of ligamentous injury.  Calcifications in the joint spaces may be due to CPPD.  MRI without contrast may be done for further evaluation.   LOCATION:  [place] [address]   Dictated by (CST): Saad Esteban MD on 5/05/2025 at 10:57 AM     Finalized by (CST): Saad Esteban MD on 5/05/2025 at 10:59 AM         IMPRESSION: Keisha Gonzalez is a 64 year old Right hand dominant female with right wrist calcifications and scapholunate injury.     PLAN:   We had a detailed discussion outlining the etiology, anatomy, pathophysiology, and natural history of the patient's findings. Imaging was reviewed in detail and correlated to a 3-dimensional model of the patient's pathology.     We reviewed the treatment of this disease condition.  Fortunately, treatment is amenable to conservative treatment which we chose to optimize at today's visit. Continue wrist brace. WE prescribed Meloxicam 15mg.   We recommended occupational therapy to aid in strengthening,  range of motion, functional improvement, and return to baseline activity.  The patient had the opportunity to ask questions and all questions were answered appropriately.      FOLLOW-UP:   Return to clinic on an as needed basis.           ANITA Lewis, PAJoseC Orthopedic Surgery / Sports Medicine Specialist  [facility]  [address] [place] [address]   [email or web address]  [email or web address]  t: [phone]  o: [phone]  f: [phone]    This note was dictated using Dragon software.  While it was briefly proofread prior to completion, some grammatical, spelling, and word choice errors due to dictation may still occur.           [1]   Past Medical History:   Migraine   [2]   Past Surgical History:  Procedure Laterality Date    Cholecystectomy           [3]   Family History  Problem Relation Age of Onset    Diabetes Mother    [4]   Current Outpatient Medications   Medication Sig Dispense Refill    ibuprofen 600 MG Oral Tab Take 1 tablet (600 mg total) by mouth every 8 (eight) hours as needed for Pain or Fever. 30 tablet 0

## 2025-05-15 ENCOUNTER — TELEPHONE (OUTPATIENT)
Dept: ORTHOPEDICS CLINIC | Facility: CLINIC | Age: 65
End: 2025-05-15

## 2025-05-15 NOTE — TELEPHONE ENCOUNTER
Family Medical Leave Act form  with Incomplete Release of Information received and logged for processing.     Called patient and unable to leave message. - no voicemail  [employer] message sent

## 2025-05-16 NOTE — TELEPHONE ENCOUNTER
Patient called about mychart release, stating confusion. Explained reason for msg, and attached qnr. No further questions

## 2025-05-20 NOTE — TELEPHONE ENCOUNTER
Called patient and conference in the Language Line   Agent: Alissa ID: [id number] - details were provided. - Patient will call and schedule follow up appointment.    Type of Leave: Continuous   Reason for Leave: right wrist pain   Start date of leave: 5/6/25  End date of leave: 6/30/25 pending re eval ? Patient to call and schedule appointment.  How many flare ups per month/length?:  How many appts per month/length?:   Was Fee and Turnaround info Given?: Yes

## 2025-05-22 NOTE — TELEPHONE ENCOUNTER
Family Medical Leave Act form completed and signed by provider. Faxed to [facility] [phone] confirmation received.

## 2025-05-22 NOTE — TELEPHONE ENCOUNTER
Sincer    Please sign off on form if you agree to: Family Medical Leave Act for Wrist Pain    -Signature page will be the first page scanned  -From your Inbasket, Highlight the patient and click Chart   -Double click the 5/15/25 Forms Completion telephone encounter  -Scroll down to the Media section   -Click the blue Hyperlink: Family Medical Leave Act   JUVENAL Vargas  5/22/25  -Click Acknowledge located in the top right ribbon/menu   -Drag the mouse into the blank space of the document and a + sign will appear. Left click to   electronically sign the document.  -Once signed, simply exit out of the screen and you signature will be saved.     Thank you,    Jh BAUMANN

## 2025-06-30 ENCOUNTER — OFFICE VISIT (OUTPATIENT)
Dept: ORTHOPEDICS CLINIC | Facility: CLINIC | Age: 65
End: 2025-06-30
Payer: COMMERCIAL

## 2025-06-30 ENCOUNTER — HOSPITAL ENCOUNTER (OUTPATIENT)
Dept: GENERAL RADIOLOGY | Age: 65
Discharge: HOME | End: 2025-06-30
Attending: PHYSICIAN ASSISTANT
Payer: COMMERCIAL

## 2025-06-30 DIAGNOSIS — M54.12 CERVICAL RADICULITIS: ICD-10-CM

## 2025-06-30 DIAGNOSIS — M54.12 CERVICAL RADICULITIS: Primary | ICD-10-CM

## 2025-06-30 DIAGNOSIS — M75.42 ROTATOR CUFF IMPINGEMENT SYNDROME, LEFT: ICD-10-CM

## 2025-06-30 DIAGNOSIS — M75.41 ROTATOR CUFF IMPINGEMENT SYNDROME OF RIGHT SHOULDER: ICD-10-CM

## 2025-06-30 PROCEDURE — 72052 X-RAY EXAM NECK SPINE 6/>VWS: CPT | Performed by: PHYSICIAN ASSISTANT

## 2025-06-30 PROCEDURE — 99213 OFFICE O/P EST LOW 20 MIN: CPT | Performed by: PHYSICIAN ASSISTANT

## 2025-06-30 RX ORDER — METHYLPREDNISOLONE 4 MG/1
TABLET ORAL
Qty: 1 EACH | Refills: 0 | Status: SHIPPED | OUTPATIENT
Start: 2025-06-30

## 2025-06-30 NOTE — PROGRESS NOTES
[facility] - ORTHOPEDICS  [address] [place] [address]  [phone]       Name: Keisha Gonzalez   MRN: [id number]  Date: 6/30/2025     REASON FOR VISIT: Follow up for right wrist calcifications and scapholunate injury.     INTERVAL HISTORY:  Keisha Gonzalez is a 65 year old female who returns for evaluation of right wrist calcifications scapholunate injury.     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 7/10 pain. She complains of swelling. She notes less than 50% of normal function. She has noted near resolution of her hand pain, but complains more of arm, shoulder pain and some intermittent tingling.     At the patient's last visit we recommended initiation of conservative treatment and prescribed meloxicam, 15 mg as well as occupational therapy.        ROS: ROS    PE:   There were no vitals filed for this visit.  Estimated body mass index is 23.43 kg/m² as calculated from the following:    Height as of 5/6/25: 4' 11\" (1.499 m).    Weight as of 5/6/25: 116 lb (52.6 kg).    Physical Exam  Constitutional:       Appearance: Normal appearance.   HENT:      Head: Normocephalic and atraumatic.   Eyes:      Extraocular Movements: Extraocular movements intact.   Neck:      Musculoskeletal: Normal range of motion and neck supple.   Cardiovascular:      Pulses: Normal pulses.   Pulmonary:      Effort: Pulmonary effort is normal. No respiratory distress.   Abdominal:      General: There is no distension.   Skin:     General: Skin is warm.      Capillary Refill: Capillary refill takes less than 2 seconds.      Findings: No bruising.   Neurological:      General: No focal deficit present.      Mental Status: She is alert.   Psychiatric:         Mood and Affect: Mood normal.       Examination of the  right and left shoulder demonstrates:     Skin is intact, warm and dry.   Cervical:  Full ROM  Spurling's  Positive    Deformity:   none  Atrophy:    none    Scapular winging: Negative    Palpation:     AC Joint  Negative  Biceps Tendon  Negative  Greater Tuberosity Negative    ROM:   Forward Flexion:  full and symmetric  Abduction:   full and symmetric  External Rotation:  full and symmetric  Internal Rotation:  full and symmetric    Rotator Cuff Strength:   Supraspinatus:   5/5  Subscapularis:   5/5  Infraspinatus/Teres: 5/5    Provocative Tests:   Castellanos:   Positive  Speed's:   Negative  Bowlus's:   Negative  Lift-off:   Negative  Apprehension:  Negative  Sulcus Sign:   Negative    Neurovascular Upper Extremity (Bilateral)  Motor:    5/5 EPL, Finger Abduction, , Pinch, Deltoid  Sensation:   intact to light touch median, ulnar, radial and axillary nerve  Circulation:   Normal, 2+ radial pulse    The contralateral upper extremity is without limitation in range of motion or strength, no positive provocative maneuvers.       Radiographic Examination/Diagnostics:    I personally viewed, independently interpreted and radiology report was reviewed.      IMPRESSION: Keisha Gonzalez is a 65 year old female who presented for follow up of cervical radiculitis, bilateral shoulder impingement and resolving hand pain.     PLAN:   We had a detailed discussion outlining the etiology, anatomy, pathophysiology, and natural history of the patient's findings.    We reviewed the treatment of this disease condition.  Obtain cervical x-rays today.    Her hand pain has largely resolved, but today she complains of tingling and numbness bilaterally and findings consistent with a cervical etiology. I recommend medrol dose pack, and physical therapy. Follow up in four weeks.     FOLLOW-UP:  Return to clinic in four weeks. No imaging required at next visit.             Yakelin Rossi [facility], PA-C Orthopedic Surgery / Sports Medicine Specialist  [facility]  [address] [place] [id number]   [email or web address]  [email or web address]  t: [phone]  o:  [phone]  f: [phone]    This note was dictated using Dragon software.  While it was briefly proofread prior to completion, some grammatical, spelling, and word choice errors due to dictation may still occur.

## 2025-07-25 ENCOUNTER — OFFICE VISIT (OUTPATIENT)
Dept: ORTHOPEDICS CLINIC | Facility: CLINIC | Age: 65
End: 2025-07-25
Payer: COMMERCIAL

## 2025-07-25 DIAGNOSIS — M54.12 CERVICAL RADICULITIS: ICD-10-CM

## 2025-07-25 DIAGNOSIS — M75.42 ROTATOR CUFF IMPINGEMENT SYNDROME, LEFT: ICD-10-CM

## 2025-07-25 DIAGNOSIS — M75.41 ROTATOR CUFF IMPINGEMENT SYNDROME OF RIGHT SHOULDER: Primary | ICD-10-CM

## 2025-07-25 PROCEDURE — 99213 OFFICE O/P EST LOW 20 MIN: CPT | Performed by: PHYSICIAN ASSISTANT

## 2025-07-25 NOTE — PROGRESS NOTES
[facility] - ORTHOPEDICS  [address] [place] [address]  [phone]       Name: Keisha Gonzalez   MRN: [id number]  Date: 7/25/2025     REASON FOR VISIT: Follow up for right wrist calcifications and scapholunate injury, bilateral shoulder impingement and cervical radiculitis.    INTERVAL HISTORY:  Keisha Gonzalez is a 65 year old female who returns for evaluation of right wrist calcifications and scapholunate injury, and cervical radiculitis. She has noted near complete resolution of symptoms.  Overall the patient is doing well.  She rates her pain to be a 0-5 out of 10.  She has been doing physical therapy and has noted significant improvement.      ROS: ROS    PE:   There were no vitals filed for this visit.  Estimated body mass index is 23.43 kg/m² as calculated from the following:    Height as of 5/6/25: 4' 11\" (1.499 m).    Weight as of 5/6/25: 116 lb (52.6 kg).    Physical Exam  Constitutional:       Appearance: Normal appearance.   HENT:      Head: Normocephalic and atraumatic.   Eyes:      Extraocular Movements: Extraocular movements intact.   Neck:      Musculoskeletal: Normal range of motion and neck supple.   Cardiovascular:      Pulses: Normal pulses.   Pulmonary:      Effort: Pulmonary effort is normal. No respiratory distress.   Abdominal:      General: There is no distension.   Skin:     General: Skin is warm.      Capillary Refill: Capillary refill takes less than 2 seconds.      Findings: No bruising.   Neurological:      General: No focal deficit present.      Mental Status: She is alert.   Psychiatric:         Mood and Affect: Mood normal.       Examination of the RIGHT AND LEFT shoulder demonstrates:     Skin is intact, warm and dry.   Cervical:  Full ROM  Spurling's  Negative    Deformity:   none  Atrophy:   none    Scapular winging: Negative    Palpation:     AC Joint  Negative  Biceps Tendon  Negative  Greater Tuberosity Negative    ROM:   Forward Flexion:  full  and symmetric  Abduction:   full and symmetric  External Rotation:  full and symmetric  Internal Rotation:  full and symmetric    Rotator Cuff Strength:   Supraspinatus:   5/5  Subscapularis:   5/5  Infraspinatus/Teres: 5/5    Provocative Tests:   Castellanos:   Positive  Speed's:   Negative  Naselle's:   Negative  Lift-off:   Negative  Apprehension:  Negative  Sulcus Sign:   Negative    Neurovascular Upper Extremity (Bilateral)  Motor:    5/5 EPL, Finger Abduction, , Pinch, Deltoid  Sensation:   intact to light touch median, ulnar, radial and axillary nerve  Circulation:   Normal, 2+ radial pulse    The contralateral upper extremity is without limitation in range of motion or strength, no positive provocative maneuvers.       Radiographic Examination/Diagnostics:    I personally viewed, independently interpreted and radiology report was reviewed.    XR CERVICAL SPINE COMPLETE W/FLEX + EXT (CPT=72052)  Result Date: 6/30/2025  PROCEDURE: XR CERVICAL SPINE COMPLETE W/FLEX + EXT (CPT=72052) INDICATIONS: DX-M54.12 Cervical radiculitis; PATIENT STATED HISTORY: Ortho consult. Patient states bilateral hand numbness and neck pain for many months, no known injury. COMPARISON: There are no comparisons for this exam. TECHNIQUE:  AP, lateral, obliques, coned down view, and flexion/extension views of the spine were obtained. FINDINGS: There is straightening of the cervical lordosis. There is minimal anterolisthesis of C7 on T1. The vertebral body heights and disc heights are preserved. There is mild to moderate scattered facet hypertrophy. No significant subluxation is seen with flexion or extension. There is mild to moderate left-sided bony foraminal narrowing at C3-C4 which may be projectional.     CONCLUSION: Mild to moderate degenerative changes in the cervical spine with scattered facet arthropathy Electronically Verified and Signed by Attending Radiologist: LeRoy Stromberg MD 6/30/2025 5:51 PM Workstation:  [id number]      IMPRESSION: Keisha Gonzalez is a 65 year old female who presented for follow up of bilateral shoulder impingement, cervical radiculitis and right wrist calcifications and scapholunate injury.  PLAN:   We had a detailed discussion outlining the etiology, anatomy, pathophysiology, and natural history of the patient's findings.    We reviewed the treatment of this disease condition.      I recommend transitioning to a HEP.   The patient had opportunity to ask questions and all questions were answered appropriately.    FOLLOW-UP:  Return to clinic on an as needed basis.             Yakelin Rossi, [facility], PA-C Orthopedic Surgery / Sports Medicine Specialist  [facility]  [address] [place] [address].org  [email or web address]  t: [phone]  o: [phone]  f: [phone]    This note was dictated using Dragon software.  While it was briefly proofread prior to completion, some grammatical, spelling, and word choice errors due to dictation may still occur.

## 2025-08-05 ENCOUNTER — TELEPHONE (OUTPATIENT)
Dept: ORTHOPEDICS CLINIC | Facility: CLINIC | Age: 65
End: 2025-08-05

## 2025-08-21 ENCOUNTER — TELEPHONE (OUTPATIENT)
Dept: FAMILY MEDICINE CLINIC | Facility: CLINIC | Age: 65
End: 2025-08-21

## 2025-08-22 ENCOUNTER — MED REC SCAN ONLY (OUTPATIENT)
Dept: ORTHOPEDICS CLINIC | Facility: CLINIC | Age: 65
End: 2025-08-22

## (undated) NOTE — Clinical Note
I had the pleasure of seeing Pam Camara on 12/3/2018. Please see my attached note. Eber Martinez MD [facility]--Surgery

## (undated) NOTE — Clinical Note
Tahir Curiel!Thank you for referring Ms. Adria Fox for rheumatologic evaluation. Please see the discussion portion of my note and let me know if you have any questions.  She does not have any obvious signs of lupus or Sjogren's today, however due to the incr

## (undated) NOTE — MR AVS SNAPSHOT
[place]  [address] [facility] [address]  [address] [place] [phone] [phone]               Thank you for choosing us for your health care visit with Ivania Hein MD.  We are glad to serve you and happy to provide you with this summ authorization numbers or be assured that none are required. You can then schedule your appointment. Failure to obtain required authorization numbers can create reimbursement difficulties for you.     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  [place] reducirá la hinchazón y el dolor. El calor (duchas o parvin en ag Siéntese en kayla con respaldos rectos o con soporte para la parte inferior de la espalda. Mantenga las rodillas un poco más elevadas que las caderas.  En son necesario, use un banquito o taburete bajo para [place] apoyados sobre jignesh superficie · Entumecimiento en la christine de las ingles  Date Last Reviewed: 6/1/2016  © 6951-2558 The [address] [place] [address], [address] [place] [address]. Todos los derechos reservados.  Esta información no pretende sustituir la [facility]. 6. Extensión lumbar boca abajo: Acuéstese boca abajo, con los brazos extendidos [place] y las pamella contra el piso. [place] simultáneamente bass Julia Justice y bass pierna izquierda hasta donde pueda elevarlos sin sentir molestias.  Sostenga esta posició flexionadas   El abdominal con elevación de rodillas flexionadas fortalece los músculos abdominales inferiores, lo que ayuda a estabilizar bass pelvis y bass espalda:  · Acuéstese boca arriba en el piso con las rodillas flexionadas.  Apoye los pies en el piso y antonio en el suelo). [facility] y las mike debajo de los hombros. Mantenga la columna en posición neutral (sin arquearla ni aflojarla). Asegúrese de [employer] curva natural del filipe.   · Contraiga los músculos abdomin Kristan alvarez, dejando los pies apoyados contra el piso. · Contraiga los músculos abdominales y los glúteos.  Eleve lentamente los glúteos hasta formar jignesh línea recta entre las rodillas y los hombros.  · Sostenga la posición celia 5 segund Date Last Reviewed: 8/16/2015  © 8922-4184 The [address] [facility], [address] [place] [address]. Todos los derechos reservados.  Esta información no pretende sustituir la atención médica profesional. Sólo bass médico puede diagnosticar y trata © 5099-6509 The [address] [facility], [facility] [place]. Todos los derechos reservados. Esta información no pretende sustituir la atención médica profesional. Sólo bass médico puede diagnosticar y tratar un problema de janell. [address]. Todos los derechos reservados. Esta información no pretende sustituir la atención médica profesional. Sólo bass médico puede diagnosticar y tratar un problema de janell.         Ejercicios para la espalda: Rotación de espalda lumbar  Para comenzar, acué Para comenzar, acuéstese boca arriba con las rodillas flexionadas y las plantas de los pies apoyadas en el suelo. No empuje el filipe ni la parte inferior de la espalda contra el piso. Respire hondo. Debe sentirse cómodo y relajado en esta posición. inferior de la espalda contra el piso. Respire hondo. Debe sentirse cómodo y relajado en esta posición:  · Contraiga el abdomen y los glúteos, y empuje la espalda lumbar contra el piso. Francisca movimiento debe ser pequeño y [place]. No debe aumentar bass dolor. Take 1 tablet by mouth daily. MyChart     Sign up for [employer], your secure online medical record. [employer] will allow you to access patient instructions from your recent visit,  view other health information, and more.  To sign up or find

## (undated) NOTE — LETTER
Date: 6/30/2025    Patient Name: Keisha Gonzalez          To Whom it may concern:    This letter has been written at the patient's request. The above patient was seen at [facility] for treatment of a medical condition.    She can return to work with restrictions of no lifting, pulling or pushing with right hand for four weeks. NO restrictions after four weeks.       Sincerely,          Josephr ANTHONY Rossi [facility], PA-C Orthopedic Surgery / Sports Medicine Specialist  [facility]  [address] [place] [address].org  [email or web address]  t: [phone]  o: [phone]  f: [phone]    This note was dictated using Dragon software.  While it was briefly proofread prior to completion, some grammatical, spelling, and word choice errors due to dictation may still occur.

## (undated) NOTE — LETTER
Date: 7/25/2025    Patient Name: Keisha Gonzalez          To Whom it may concern:    This letter has been written at the patient's request. The above patient was seen at [facility] for treatment of a medical condition.    She can return to work in two weeks without restrictions.       Sincerely,          Sincer ANTHONY Rossi [facility], PA-C Orthopedic Surgery / Sports Medicine Specialist  [facility]  [address] [place] [address].org  [email or web address]  t: [phone]  o: [phone]  f: [phone]    This note was dictated using Dragon software.  While it was briefly proofread prior to completion, some grammatical, spelling, and word choice errors due to dictation may still occur.

## (undated) NOTE — LETTER
Date & Time: 5/5/2025, 11:21 AM  Patient: Keisha Gonzalez  Encounter Provider(s):    Marvin Helm MD Serdiuk, Jennifer, APRN       To Whom It May Concern:    Keisha Gonzalez was seen and treated in our department on 5/5/2025. She can return to work 05/07/2025.    If you have any questions or concerns, please do not hesitate to call.        _____________________________  Physician/APC Signature

## (undated) NOTE — LETTER
Date: 5/6/2025    Patient Name: Keisha Gonzalez          To Whom it may concern:    This letter has been written at the patient's request. The above patient was seen at [facility] for treatment of a medical condition.    This patient requires the wrist brace for four weeks. She can return to work with restrictions of no lifting, pulling or pushing with right hand for six weeks.     Sincerely,          Josephr ANTHONY Rossi [facility], PA-C Orthopedic Surgery / Sports Medicine Specialist  [facility]  [address] [place] [address].[facility]  [email or web address]  t: [phone]  o: [phone]  f: [phone]    This note was dictated using Dragon software.  While it was briefly proofread prior to completion, some grammatical, spelling, and word choice errors due to dictation may still occur.

## (undated) NOTE — LETTER
12/10/18        Yenny Briggs  [address] [place] [address] [facility] [address]      Dear [place],    [address] [facility] records indicate that you have outstanding lab work and or testing that was ordered for you and has not yet been completed:  Orders Placed This Encounte

## (undated) NOTE — LETTER
03/08/21        Cain Dias  [address] [place] [address] [facility] [address]      Dear Shannon Dunbar,    [address] [facility] records indicate that you have outstanding lab work and or testing that was ordered for you and has not yet been completed:  Orders Placed This Encounte

## (undated) NOTE — LETTER
12/27/19        Brittney Gonzalez  [address] [facility] [address]      Estimado Brittney Gonzalez,    Nuestros registros indican que tiene análisis clínicos pendientes y/o pruebas que se ordenaron en bass nombre que no se garcia completado.     Para shawanda

## (undated) NOTE — LETTER
08/02/21        [place] [address] [place] [address] [facility] [address]      Dear [facility],    [address] [place] [address] records indicate that you have outstanding lab work and or testing that was ordered for you and has not yet been completed:  Orders Placed This Encounte